# Patient Record
Sex: FEMALE | Race: WHITE | HISPANIC OR LATINO
[De-identification: names, ages, dates, MRNs, and addresses within clinical notes are randomized per-mention and may not be internally consistent; named-entity substitution may affect disease eponyms.]

---

## 2017-02-23 ENCOUNTER — APPOINTMENT (OUTPATIENT)
Dept: OBGYN | Facility: CLINIC | Age: 42
End: 2017-02-23

## 2017-03-07 ENCOUNTER — APPOINTMENT (OUTPATIENT)
Dept: OBGYN | Facility: CLINIC | Age: 42
End: 2017-03-07

## 2017-03-07 VITALS
DIASTOLIC BLOOD PRESSURE: 70 MMHG | HEIGHT: 59 IN | BODY MASS INDEX: 29.43 KG/M2 | WEIGHT: 146 LBS | SYSTOLIC BLOOD PRESSURE: 110 MMHG

## 2017-03-07 DIAGNOSIS — N39.3 STRESS INCONTINENCE (FEMALE) (MALE): ICD-10-CM

## 2017-03-09 LAB — HPV HIGH+LOW RISK DNA PNL CVX: NEGATIVE

## 2017-03-10 LAB — CYTOLOGY CVX/VAG DOC THIN PREP: NORMAL

## 2018-04-17 ENCOUNTER — APPOINTMENT (OUTPATIENT)
Dept: OBGYN | Facility: CLINIC | Age: 43
End: 2018-04-17
Payer: COMMERCIAL

## 2018-04-17 VITALS
SYSTOLIC BLOOD PRESSURE: 123 MMHG | HEIGHT: 59 IN | DIASTOLIC BLOOD PRESSURE: 81 MMHG | BODY MASS INDEX: 29.48 KG/M2 | WEIGHT: 146.25 LBS

## 2018-04-17 DIAGNOSIS — Z86.19 PERSONAL HISTORY OF OTHER INFECTIOUS AND PARASITIC DISEASES: ICD-10-CM

## 2018-04-17 DIAGNOSIS — L29.2 PRURITUS VULVAE: ICD-10-CM

## 2018-04-17 PROCEDURE — 99396 PREV VISIT EST AGE 40-64: CPT

## 2018-04-17 RX ORDER — NYSTATIN AND TRIAMCINOLONE ACETONIDE 100000; 1 [USP'U]/G; MG/G
100000-0.1 OINTMENT TOPICAL TWICE DAILY
Qty: 1 | Refills: 1 | Status: DISCONTINUED | COMMUNITY
Start: 2017-03-07 | End: 2018-04-17

## 2018-04-18 LAB — HPV HIGH+LOW RISK DNA PNL CVX: NOT DETECTED

## 2018-04-23 LAB — CYTOLOGY CVX/VAG DOC THIN PREP: NORMAL

## 2018-06-21 ENCOUNTER — APPOINTMENT (OUTPATIENT)
Dept: BREAST CENTER | Facility: CLINIC | Age: 43
End: 2018-06-21

## 2019-05-22 ENCOUNTER — RX RENEWAL (OUTPATIENT)
Age: 44
End: 2019-05-22

## 2019-10-08 ENCOUNTER — APPOINTMENT (OUTPATIENT)
Dept: OBGYN | Facility: CLINIC | Age: 44
End: 2019-10-08
Payer: COMMERCIAL

## 2019-10-08 VITALS
BODY MASS INDEX: 28.65 KG/M2 | DIASTOLIC BLOOD PRESSURE: 80 MMHG | WEIGHT: 142.13 LBS | SYSTOLIC BLOOD PRESSURE: 110 MMHG | HEIGHT: 59 IN

## 2019-10-08 PROCEDURE — 99396 PREV VISIT EST AGE 40-64: CPT

## 2019-10-08 NOTE — PROCEDURE
[Cervical Pap Smear] : cervical Pap smear [Tolerated Well] : the patient tolerated the procedure well [No Complications] : there were no complications

## 2019-10-08 NOTE — PHYSICAL EXAM
[Alert] : alert [Awake] : awake [Soft] : soft [Oriented x3] : oriented to person, place, and time [Uterine Adnexae] : were not tender and not enlarged [No Bleeding] : there was no active vaginal bleeding [Normal] : uterus [Acute Distress] : no acute distress [LAD] : no lymphadenopathy [Thyroid Nodule] : no thyroid nodule [Goiter] : no goiter [Mass] : no breast mass [Axillary LAD] : no axillary lymphadenopathy [Nipple Discharge] : no nipple discharge [H/Smegaly] : no hepatosplenomegaly [Distended] : not distended [Tender] : non tender [Flat Affect] : affect not flat [Depressed Mood] : not depressed

## 2019-10-09 LAB — HPV HIGH+LOW RISK DNA PNL CVX: NOT DETECTED

## 2019-10-14 LAB — CYTOLOGY CVX/VAG DOC THIN PREP: NORMAL

## 2020-09-30 ENCOUNTER — EMERGENCY (EMERGENCY)
Facility: HOSPITAL | Age: 45
LOS: 1 days | Discharge: HOME | End: 2020-09-30
Attending: EMERGENCY MEDICINE
Payer: COMMERCIAL

## 2020-09-30 VITALS
HEART RATE: 102 BPM | DIASTOLIC BLOOD PRESSURE: 79 MMHG | SYSTOLIC BLOOD PRESSURE: 132 MMHG | HEIGHT: 59 IN | TEMPERATURE: 98 F | WEIGHT: 139.99 LBS | RESPIRATION RATE: 20 BRPM | OXYGEN SATURATION: 98 %

## 2020-09-30 VITALS
RESPIRATION RATE: 17 BRPM | SYSTOLIC BLOOD PRESSURE: 116 MMHG | HEART RATE: 71 BPM | DIASTOLIC BLOOD PRESSURE: 70 MMHG | OXYGEN SATURATION: 98 %

## 2020-09-30 LAB
ALBUMIN SERPL ELPH-MCNC: 4.4 G/DL — SIGNIFICANT CHANGE UP (ref 3.3–5.2)
ALP SERPL-CCNC: 81 U/L — SIGNIFICANT CHANGE UP (ref 40–120)
ALT FLD-CCNC: 14 U/L — SIGNIFICANT CHANGE UP
ANION GAP SERPL CALC-SCNC: 14 MMOL/L — SIGNIFICANT CHANGE UP (ref 5–17)
AST SERPL-CCNC: 19 U/L — SIGNIFICANT CHANGE UP
BASOPHILS # BLD AUTO: 0.02 K/UL — SIGNIFICANT CHANGE UP (ref 0–0.2)
BASOPHILS NFR BLD AUTO: 0.3 % — SIGNIFICANT CHANGE UP (ref 0–2)
BILIRUB SERPL-MCNC: 0.3 MG/DL — LOW (ref 0.4–2)
BUN SERPL-MCNC: 17 MG/DL — SIGNIFICANT CHANGE UP (ref 8–20)
CALCIUM SERPL-MCNC: 9.2 MG/DL — SIGNIFICANT CHANGE UP (ref 8.6–10.2)
CHLORIDE SERPL-SCNC: 103 MMOL/L — SIGNIFICANT CHANGE UP (ref 98–107)
CO2 SERPL-SCNC: 22 MMOL/L — SIGNIFICANT CHANGE UP (ref 22–29)
CREAT SERPL-MCNC: 0.69 MG/DL — SIGNIFICANT CHANGE UP (ref 0.5–1.3)
EOSINOPHIL # BLD AUTO: 0.04 K/UL — SIGNIFICANT CHANGE UP (ref 0–0.5)
EOSINOPHIL NFR BLD AUTO: 0.6 % — SIGNIFICANT CHANGE UP (ref 0–6)
GLUCOSE SERPL-MCNC: 111 MG/DL — HIGH (ref 70–99)
HCG SERPL-ACNC: <4 MIU/ML — SIGNIFICANT CHANGE UP
HCT VFR BLD CALC: 35.7 % — SIGNIFICANT CHANGE UP (ref 34.5–45)
HGB BLD-MCNC: 12 G/DL — SIGNIFICANT CHANGE UP (ref 11.5–15.5)
IMM GRANULOCYTES NFR BLD AUTO: 0.3 % — SIGNIFICANT CHANGE UP (ref 0–1.5)
LYMPHOCYTES # BLD AUTO: 0.95 K/UL — LOW (ref 1–3.3)
LYMPHOCYTES # BLD AUTO: 14.7 % — SIGNIFICANT CHANGE UP (ref 13–44)
MCHC RBC-ENTMCNC: 28.4 PG — SIGNIFICANT CHANGE UP (ref 27–34)
MCHC RBC-ENTMCNC: 33.6 GM/DL — SIGNIFICANT CHANGE UP (ref 32–36)
MCV RBC AUTO: 84.6 FL — SIGNIFICANT CHANGE UP (ref 80–100)
MONOCYTES # BLD AUTO: 0.39 K/UL — SIGNIFICANT CHANGE UP (ref 0–0.9)
MONOCYTES NFR BLD AUTO: 6 % — SIGNIFICANT CHANGE UP (ref 2–14)
NEUTROPHILS # BLD AUTO: 5.03 K/UL — SIGNIFICANT CHANGE UP (ref 1.8–7.4)
NEUTROPHILS NFR BLD AUTO: 78.1 % — HIGH (ref 43–77)
PLATELET # BLD AUTO: 179 K/UL — SIGNIFICANT CHANGE UP (ref 150–400)
POTASSIUM SERPL-MCNC: 3.4 MMOL/L — LOW (ref 3.5–5.3)
POTASSIUM SERPL-SCNC: 3.4 MMOL/L — LOW (ref 3.5–5.3)
PROT SERPL-MCNC: 7.3 G/DL — SIGNIFICANT CHANGE UP (ref 6.6–8.7)
RBC # BLD: 4.22 M/UL — SIGNIFICANT CHANGE UP (ref 3.8–5.2)
RBC # FLD: 13.2 % — SIGNIFICANT CHANGE UP (ref 10.3–14.5)
SODIUM SERPL-SCNC: 139 MMOL/L — SIGNIFICANT CHANGE UP (ref 135–145)
TSH SERPL-MCNC: 0.64 UIU/ML — SIGNIFICANT CHANGE UP (ref 0.27–4.2)
WBC # BLD: 6.45 K/UL — SIGNIFICANT CHANGE UP (ref 3.8–10.5)
WBC # FLD AUTO: 6.45 K/UL — SIGNIFICANT CHANGE UP (ref 3.8–10.5)

## 2020-09-30 PROCEDURE — 80053 COMPREHEN METABOLIC PANEL: CPT

## 2020-09-30 PROCEDURE — 93010 ELECTROCARDIOGRAM REPORT: CPT

## 2020-09-30 PROCEDURE — 99283 EMERGENCY DEPT VISIT LOW MDM: CPT | Mod: 25

## 2020-09-30 PROCEDURE — 93005 ELECTROCARDIOGRAM TRACING: CPT

## 2020-09-30 PROCEDURE — 84702 CHORIONIC GONADOTROPIN TEST: CPT

## 2020-09-30 PROCEDURE — 85025 COMPLETE CBC W/AUTO DIFF WBC: CPT

## 2020-09-30 PROCEDURE — 99284 EMERGENCY DEPT VISIT MOD MDM: CPT

## 2020-09-30 PROCEDURE — 84443 ASSAY THYROID STIM HORMONE: CPT

## 2020-09-30 PROCEDURE — 36415 COLL VENOUS BLD VENIPUNCTURE: CPT

## 2020-09-30 PROCEDURE — 96360 HYDRATION IV INFUSION INIT: CPT

## 2020-09-30 RX ORDER — SODIUM CHLORIDE 9 MG/ML
1000 INJECTION INTRAMUSCULAR; INTRAVENOUS; SUBCUTANEOUS ONCE
Refills: 0 | Status: COMPLETED | OUTPATIENT
Start: 2020-09-30 | End: 2020-09-30

## 2020-09-30 RX ORDER — ALPRAZOLAM 0.25 MG
0.25 TABLET ORAL ONCE
Refills: 0 | Status: DISCONTINUED | OUTPATIENT
Start: 2020-09-30 | End: 2020-09-30

## 2020-09-30 RX ORDER — POTASSIUM CHLORIDE 20 MEQ
40 PACKET (EA) ORAL ONCE
Refills: 0 | Status: COMPLETED | OUTPATIENT
Start: 2020-09-30 | End: 2020-09-30

## 2020-09-30 RX ADMIN — Medication 40 MILLIEQUIVALENT(S): at 14:05

## 2020-09-30 RX ADMIN — SODIUM CHLORIDE 1000 MILLILITER(S): 9 INJECTION INTRAMUSCULAR; INTRAVENOUS; SUBCUTANEOUS at 12:02

## 2020-09-30 RX ADMIN — Medication 0.25 MILLIGRAM(S): at 12:02

## 2020-09-30 RX ADMIN — SODIUM CHLORIDE 1000 MILLILITER(S): 9 INJECTION INTRAMUSCULAR; INTRAVENOUS; SUBCUTANEOUS at 13:00

## 2020-09-30 NOTE — ED ADULT NURSE NOTE - CHIEF COMPLAINT QUOTE
pt A&Ox 4 from work c/o panic attack, pins & needles in hands and feet and palpitations. Pt with history of panic attacks, took CBD oil during attack with no relief. Denies chest pain or SOB.

## 2020-09-30 NOTE — ED PROVIDER NOTE - ATTENDING CONTRIBUTION TO CARE
well appearing adult feeling with symptoms consistent with anxiety and prior panic attack; on exam, well appearing, anxious, but NAD; clear conjunctiva; clear oropharync; normal heart and lung sounds, well perfused, abd soft nt nd, fully ambulatory; agree with acp plan of care

## 2020-09-30 NOTE — ED PROVIDER NOTE - PATIENT PORTAL LINK FT
You can access the FollowMyHealth Patient Portal offered by [facility] by registering at the following website: [email or web address]. By joining [facility]’s FollowMyHealth portal, you will also be able to view your health information using other applications (apps) compatible with our system.

## 2020-09-30 NOTE — ED PROVIDER NOTE - MUSCULOSKELETAL NEGATIVE STATEMENT, MLM
Spontaneous, unlabored and symmetrical no back pain, no gout, no musculoskeletal pain, no neck pain, and no weakness.

## 2020-09-30 NOTE — ED PROVIDER NOTE - OBJECTIVE STATEMENT
Pt is a 45y F with PMH of HLD and anxiety presenting for heart racing/ numbness to b/l feet/hands/ mouth. She states she was at work and started having these symptoms like she was going to pass out. She reported felling very heavy and having a dry mouth. She states she used to take Lexapro but stopped taking it for a while. She started taking it again around 3 weeks ago. She also takes xanax 0.5mg which she has been taking for the last 3 days. She admits to trying CBD oil for her anxiety as well. Pt states she usually gets these symptoms with her anxiety but they are worse today. She is feeling a little better now than when she came in initially. She denies any SI/HI. She denies any chest pain/ abd pain/ fever/ nausea/ vomiting/diarrhea/ drugs/ETOH use/ smoking.

## 2020-09-30 NOTE — ED PROVIDER NOTE - CLINICAL SUMMARY MEDICAL DECISION MAKING FREE TEXT BOX
Pt is a 45y M with PMH of anxiety/ HLD presenting for panic attack. Will obtain labs/medicate and reassess.

## 2020-09-30 NOTE — ED PROVIDER NOTE - PROGRESS NOTE DETAILS
Pt feeling better after medication. Labs reviewed with pt and told of need to f/u with PMD and psych. Pt agreeable. She has a prescription for xanax at home. Marvin novak.

## 2020-09-30 NOTE — ED PROVIDER NOTE - NSFOLLOWUPINSTRUCTIONS_ED_ALL_ED_FT
Follow up with PMD.  Take medication as prescribed by your doctor.  Come back with new or worsening symptoms.    Anxiety    Generalized anxiety disorder (ENRIQUE) is a mental disorder. It is defined as anxiety that is not necessarily related to specific events or activities or is out of proportion to said events. Symptoms include restlessness, fatigue, difficulty concentrations, irritability and difficulty concentrating. It may interfere with life functions, including relationships, work, and school. If you were started on a medication, make sure to take exactly as prescribed and follow up with a psychiatrist.    SEEK IMMEDIATE MEDICAL CARE IF YOU HAVE ANY OF THE FOLLOWING SYMPTOMS: thoughts about hurting killing yourself, thoughts about hurting or killing somebody else, hallucinations, or worsening depression.

## 2020-09-30 NOTE — ED ADULT TRIAGE NOTE - CHIEF COMPLAINT QUOTE
HPI Comments: 52 y.o. female with past medical history significant for anemia, bronchitis, who presents ambulatory to the ED accompanied by spouse, with chief complaint of persistent cough x 1 week. Pt estimates that she has had a non-productive cough on a daily basis for the past seven days. She additionally c/o congestion, rhinorrhea, and sinus pressure associated with the cough. Pt states that she normally develops bronchitis on a yearly basis, and her current symptoms feel similar to when she has had bronchitis in the past. She notes that she used to have an inhaler at home, but ran out of that prescription. Pt reports positive sick contacts, noting that she runs a  out of her home. Her  is sick at home with similar symptoms, and is also here for evaluation and treatment in the ED today. Pt denies any diagnosed h/o asthma, and she reports the last time she was treated for bronchitis was ~1 year ago. She specifically denies any fevers, chills, CP, and abdominal pain. There are no other acute medical concerns at this time. Social hx: Negative for Tobacco use; Negative for EtOH use; Negative for Illicit Drug Abuse    PCP: None     Note written by Emanuel Rico. Memory Natan, as dictated by LUTHER Mcdowell 7:36 PM     The history is provided by the patient. No  was used. Past Medical History:   Diagnosis Date    Anemia     Bronchitis     History of endometrial biopsy 07/14/2017       Past Surgical History:   Procedure Laterality Date    ABDOMEN SURGERY PROC UNLISTED  2008    removed gallstones, not gallbladder    HX APPENDECTOMY      HX CHOLECYSTECTOMY  2008    w/appendix out also    HX OTHER SURGICAL Right 01/2015    wrist surgery     HX TOTAL LAPAROSCOPIC HYSTERECTOMY W/ BS&O  07/20/2017    HX TUBAL LIGATION           History reviewed. No pertinent family history.     Social History     Social History    Marital status:      Spouse name: N/A   Edmar Number of children: N/A    Years of education: N/A     Occupational History    Not on file. Social History Main Topics    Smoking status: Never Smoker    Smokeless tobacco: Never Used    Alcohol use No    Drug use: No    Sexual activity: Yes     Partners: Male     Birth control/ protection: Surgical     Other Topics Concern    Not on file     Social History Narrative         ALLERGIES: Review of patient's allergies indicates no known allergies. Review of Systems   Constitutional: Negative. Negative for chills and fever. HENT: Positive for congestion, rhinorrhea and sinus pressure. Negative for ear discharge. Eyes: Negative for photophobia, pain, discharge and visual disturbance. Respiratory: Positive for cough. Negative for apnea and wheezing. Cardiovascular: Negative for chest pain, palpitations and leg swelling. Gastrointestinal: Negative for abdominal distention, abdominal pain and blood in stool. Genitourinary: Negative for difficulty urinating, dysuria, flank pain, frequency and hematuria. Musculoskeletal: Negative for back pain, gait problem, joint swelling, myalgias and neck pain. Skin: Negative for color change and pallor. Neurological: Negative for dizziness, syncope, weakness, numbness and headaches. Psychiatric/Behavioral: Negative for behavioral problems and confusion. The patient is not nervous/anxious. Vitals:    01/08/18 1851   BP: 110/62   Pulse: 79   Resp: 20   Temp: 98.2 °F (36.8 °C)   SpO2: 98%   Weight: 59 kg (130 lb)   Height: 4' 11\" (1.499 m)            Physical Exam   Constitutional: She is oriented to person, place, and time. She appears well-developed and well-nourished. No distress. HENT:   Head: Normocephalic and atraumatic. Right Ear: External ear normal.   Left Ear: External ear normal.   Nose: Rhinorrhea (clear) present.    Mouth/Throat: Oropharynx is clear and moist.   Eyes: Conjunctivae and EOM are normal. Pupils are equal, round, and reactive to light. Right eye exhibits no discharge. Left eye exhibits no discharge. Neck: Normal range of motion. Neck supple. Cardiovascular: Normal rate, regular rhythm, normal heart sounds and intact distal pulses. Pulmonary/Chest: Effort normal. She has decreased breath sounds (mildly diminished in bilateral bases). Abdominal: Soft. Bowel sounds are normal. She exhibits no distension. There is no tenderness. There is no rebound and no guarding. Musculoskeletal: Normal range of motion. She exhibits no edema or tenderness. Neurological: She is alert and oriented to person, place, and time. No cranial nerve deficit. Coordination normal.   Skin: Skin is warm and dry. No rash noted. Psychiatric: She has a normal mood and affect. Her behavior is normal. Judgment and thought content normal.   Nursing note and vitals reviewed. Note written by Darlene Wright, as dictated by LUTHER Monroy 7:36 PM     MDM  Number of Diagnoses or Management Options  Cough:   Diagnosis management comments: 51 yo female with cough and sinus pressure x 1 week;  with same; hx of bronchitis and feels similar; will treat and have PCP followup. LUTHER Monroy         Amount and/or Complexity of Data Reviewed  Tests in the radiology section of CPT®: ordered and reviewed  Discuss the patient with other providers: yes  Independent visualization of images, tracings, or specimens: yes      ED Course       Procedures    PROGRESS NOTE:  8:26 PM   Discussed case with Nikole Goyal MD, who agrees with plan. Will discharge patient with PCP follow-up. She is agreeable with plan. Patient's results have been reviewed with them. Patient and/or family have verbally conveyed their understanding and agreement of the patient's signs, symptoms, diagnosis, treatment and prognosis and additionally agree to follow up as recommended or return to the Emergency Room should their condition change prior to follow-up. Discharge instructions have also been provided to the patient with some educational information regarding their diagnosis as well a list of reasons why they would want to return to the ER prior to their follow-up appointment should their condition change.   LUTHER Alvares pt A&Ox 4 from work c/o panic attack, pins & needles in hands and feet and palpitations. Pt with history of panic attacks, took CBD oil during attack with no relief. Denies chest pain or SOB.

## 2020-10-29 ENCOUNTER — APPOINTMENT (OUTPATIENT)
Dept: OBGYN | Facility: CLINIC | Age: 45
End: 2020-10-29
Payer: COMMERCIAL

## 2020-10-29 VITALS
DIASTOLIC BLOOD PRESSURE: 80 MMHG | HEIGHT: 59 IN | WEIGHT: 136.13 LBS | SYSTOLIC BLOOD PRESSURE: 120 MMHG | BODY MASS INDEX: 27.44 KG/M2 | TEMPERATURE: 97.1 F

## 2020-10-29 DIAGNOSIS — F41.0 PANIC DISORDER [EPISODIC PAROXYSMAL ANXIETY]: ICD-10-CM

## 2020-10-29 DIAGNOSIS — R92.8 OTHER ABNORMAL AND INCONCLUSIVE FINDINGS ON DIAGNOSTIC IMAGING OF BREAST: ICD-10-CM

## 2020-10-29 DIAGNOSIS — R10.2 PELVIC AND PERINEAL PAIN: ICD-10-CM

## 2020-10-29 DIAGNOSIS — N60.01 SOLITARY CYST OF RIGHT BREAST: ICD-10-CM

## 2020-10-29 PROCEDURE — 99396 PREV VISIT EST AGE 40-64: CPT

## 2020-10-29 PROCEDURE — 99072 ADDL SUPL MATRL&STAF TM PHE: CPT

## 2020-10-29 RX ORDER — ALPRAZOLAM 0.5 MG/1
0.5 TABLET ORAL
Refills: 0 | Status: ACTIVE | COMMUNITY
Start: 2020-10-29

## 2020-10-29 NOTE — HISTORY OF PRESENT ILLNESS
[Mammogramdate] : 9/19 W F/U RIGHT SIDE IN 3/20 [PapSmeardate] : 2019 [Regular Cycle Intervals] : periods have been regular

## 2020-10-30 LAB — HPV HIGH+LOW RISK DNA PNL CVX: NOT DETECTED

## 2020-11-03 LAB — CYTOLOGY CVX/VAG DOC THIN PREP: NORMAL

## 2021-09-28 PROBLEM — F41.9 ANXIETY DISORDER, UNSPECIFIED: Chronic | Status: ACTIVE | Noted: 2020-09-30

## 2021-09-28 PROBLEM — E78.00 PURE HYPERCHOLESTEROLEMIA, UNSPECIFIED: Chronic | Status: ACTIVE | Noted: 2020-09-30

## 2021-10-29 ENCOUNTER — NON-APPOINTMENT (OUTPATIENT)
Age: 46
End: 2021-10-29

## 2021-10-29 ENCOUNTER — APPOINTMENT (OUTPATIENT)
Dept: OBGYN | Facility: CLINIC | Age: 46
End: 2021-10-29
Payer: COMMERCIAL

## 2021-10-29 VITALS
BODY MASS INDEX: 31.25 KG/M2 | SYSTOLIC BLOOD PRESSURE: 110 MMHG | WEIGHT: 155 LBS | HEIGHT: 59 IN | DIASTOLIC BLOOD PRESSURE: 70 MMHG

## 2021-10-29 DIAGNOSIS — Z83.3 FAMILY HISTORY OF DIABETES MELLITUS: ICD-10-CM

## 2021-10-29 DIAGNOSIS — Z01.419 ENCOUNTER FOR GYNECOLOGICAL EXAMINATION (GENERAL) (ROUTINE) W/OUT ABNORMAL FINDINGS: ICD-10-CM

## 2021-10-29 DIAGNOSIS — Z82.49 FAMILY HISTORY OF ISCHEMIC HEART DISEASE AND OTHER DISEASES OF THE CIRCULATORY SYSTEM: ICD-10-CM

## 2021-10-29 DIAGNOSIS — Z83.42 FAMILY HISTORY OF FAMILIAL HYPERCHOLESTEROLEMIA: ICD-10-CM

## 2021-10-29 PROCEDURE — 99386 PREV VISIT NEW AGE 40-64: CPT

## 2021-10-29 PROCEDURE — 36415 COLL VENOUS BLD VENIPUNCTURE: CPT

## 2021-10-29 RX ORDER — ELVITEGRAVIR, COBICISTAT, EMTRICITABINE, AND TENOFOVIR ALAFENAMIDE 150; 150; 200; 10 MG/1; MG/1; MG/1; MG/1
TABLET ORAL
Refills: 0 | Status: ACTIVE | COMMUNITY

## 2021-10-29 RX ORDER — ESCITALOPRAM OXALATE 5 MG/1
TABLET, FILM COATED ORAL
Refills: 0 | Status: ACTIVE | COMMUNITY

## 2021-10-29 RX ORDER — NYSTATIN 100000 [USP'U]/G
100000 CREAM TOPICAL TWICE DAILY
Qty: 1 | Refills: 1 | Status: DISCONTINUED | COMMUNITY
Start: 2021-04-22 | End: 2021-10-29

## 2021-10-29 NOTE — PHYSICAL EXAM
[Chaperone Present] : A chaperone was present in the examining room during all aspects of the physical examination [Appropriately responsive] : appropriately responsive [Alert] : alert [No Acute Distress] : no acute distress [Soft] : soft [Non-tender] : non-tender [Non-distended] : non-distended [No Mass] : no mass [Oriented x3] : oriented x3 [Examination Of The Breasts] : a normal appearance [No Discharge] : no discharge [No Masses] : no breast masses were palpable [Labia Majora] : normal [Labia Minora] : normal [No Bleeding] : There was no active vaginal bleeding [Normal] : normal [Uterine Adnexae] : normal

## 2021-11-03 LAB
C TRACH RRNA SPEC QL NAA+PROBE: NOT DETECTED
CANDIDA VAG CYTO: NOT DETECTED
ESTRADIOL SERPL-MCNC: 88 PG/ML
FSH SERPL-MCNC: 6 IU/L
G VAGINALIS+PREV SP MTYP VAG QL MICRO: NOT DETECTED
HAV IGM SER QL: NONREACTIVE
HBV CORE IGM SER QL: NONREACTIVE
HBV SURFACE AG SER QL: NONREACTIVE
HCG SERPL-MCNC: <1 MIU/ML
HCV AB SER QL: NONREACTIVE
HCV S/CO RATIO: 0.13 S/CO
HPV HIGH+LOW RISK DNA PNL CVX: NOT DETECTED
HSV 1+2 IGG SER IA-IMP: POSITIVE
HSV 1+2 IGG SER IA-IMP: POSITIVE
HSV1 IGG SER QL: 23.3 INDEX
HSV2 IGG SER QL: 18.9 INDEX
N GONORRHOEA RRNA SPEC QL NAA+PROBE: NOT DETECTED
SOURCE AMPLIFICATION: NORMAL
SOURCE TP AMPLIFICATION: NORMAL
T PALLIDUM AB SER QL IA: NEGATIVE
T VAGINALIS RRNA SPEC QL NAA+PROBE: NOT DETECTED
T VAGINALIS VAG QL WET PREP: NOT DETECTED
TSH SERPL-ACNC: 1.01 UIU/ML

## 2021-11-04 LAB — CYTOLOGY CVX/VAG DOC THIN PREP: NORMAL

## 2021-11-09 ENCOUNTER — NON-APPOINTMENT (OUTPATIENT)
Age: 46
End: 2021-11-09

## 2021-11-09 LAB
BASOPHILS # BLD AUTO: 0.07 K/UL
BASOPHILS NFR BLD AUTO: 0.9 %
EOSINOPHIL # BLD AUTO: 0.83 K/UL
EOSINOPHIL NFR BLD AUTO: 10.5 %
HCT VFR BLD CALC: 36.5 %
HGB BLD-MCNC: 11.3 G/DL
IMM GRANULOCYTES NFR BLD AUTO: 0.4 %
LYMPHOCYTES # BLD AUTO: 2.26 K/UL
LYMPHOCYTES NFR BLD AUTO: 28.5 %
MAN DIFF?: NORMAL
MCHC RBC-ENTMCNC: 27.2 PG
MCHC RBC-ENTMCNC: 31 GM/DL
MCV RBC AUTO: 88 FL
MONOCYTES # BLD AUTO: 0.74 K/UL
MONOCYTES NFR BLD AUTO: 9.3 %
NEUTROPHILS # BLD AUTO: 3.99 K/UL
NEUTROPHILS NFR BLD AUTO: 50.4 %
PLATELET # BLD AUTO: 216 K/UL
PROLACTIN SERPL-MCNC: 27.4 NG/ML
RBC # BLD: 4.15 M/UL
RBC # FLD: 14.2 %
WBC # FLD AUTO: 7.92 K/UL

## 2021-11-15 ENCOUNTER — APPOINTMENT (OUTPATIENT)
Dept: OBGYN | Facility: CLINIC | Age: 46
End: 2021-11-15
Payer: COMMERCIAL

## 2021-11-15 ENCOUNTER — ASOB RESULT (OUTPATIENT)
Age: 46
End: 2021-11-15

## 2021-11-15 VITALS
WEIGHT: 150 LBS | BODY MASS INDEX: 30.24 KG/M2 | HEIGHT: 59 IN | SYSTOLIC BLOOD PRESSURE: 106 MMHG | DIASTOLIC BLOOD PRESSURE: 68 MMHG

## 2021-11-15 DIAGNOSIS — Z86.018 PERSONAL HISTORY OF OTHER BENIGN NEOPLASM: ICD-10-CM

## 2021-11-15 PROCEDURE — 76830 TRANSVAGINAL US NON-OB: CPT

## 2021-11-15 PROCEDURE — 99213 OFFICE O/P EST LOW 20 MIN: CPT | Mod: 25

## 2021-11-17 ENCOUNTER — NON-APPOINTMENT (OUTPATIENT)
Age: 46
End: 2021-11-17

## 2021-11-17 LAB
HSV+VZV DNA SPEC QL NAA+PROBE: NOT DETECTED
SPECIMEN SOURCE: NORMAL

## 2021-11-19 ENCOUNTER — NON-APPOINTMENT (OUTPATIENT)
Age: 46
End: 2021-11-19

## 2021-11-19 LAB
CANDIDA VAG CYTO: DETECTED
G VAGINALIS+PREV SP MTYP VAG QL MICRO: DETECTED
T VAGINALIS VAG QL WET PREP: NOT DETECTED

## 2022-01-07 ENCOUNTER — OUTPATIENT (OUTPATIENT)
Dept: OUTPATIENT SERVICES | Facility: HOSPITAL | Age: 47
LOS: 1 days | End: 2022-01-07
Payer: COMMERCIAL

## 2022-01-07 DIAGNOSIS — Z01.818 ENCOUNTER FOR OTHER PREPROCEDURAL EXAMINATION: ICD-10-CM

## 2022-01-07 LAB
A1C WITH ESTIMATED AVERAGE GLUCOSE RESULT: 5.6 % — SIGNIFICANT CHANGE UP (ref 4–5.6)
ALBUMIN SERPL ELPH-MCNC: 4.3 G/DL — SIGNIFICANT CHANGE UP (ref 3.3–5.2)
ALP SERPL-CCNC: 85 U/L — SIGNIFICANT CHANGE UP (ref 40–120)
ALT FLD-CCNC: 13 U/L — SIGNIFICANT CHANGE UP
ANION GAP SERPL CALC-SCNC: 15 MMOL/L — SIGNIFICANT CHANGE UP (ref 5–17)
APTT BLD: 27 SEC — LOW (ref 27.5–35.5)
AST SERPL-CCNC: 15 U/L — SIGNIFICANT CHANGE UP
BASOPHILS # BLD AUTO: 0.01 K/UL — SIGNIFICANT CHANGE UP (ref 0–0.2)
BASOPHILS NFR BLD AUTO: 0.1 % — SIGNIFICANT CHANGE UP (ref 0–2)
BILIRUB SERPL-MCNC: 0.4 MG/DL — SIGNIFICANT CHANGE UP (ref 0.4–2)
BUN SERPL-MCNC: 16.3 MG/DL — SIGNIFICANT CHANGE UP (ref 8–20)
CALCIUM SERPL-MCNC: 8.8 MG/DL — SIGNIFICANT CHANGE UP (ref 8.6–10.2)
CHLORIDE SERPL-SCNC: 98 MMOL/L — SIGNIFICANT CHANGE UP (ref 98–107)
CO2 SERPL-SCNC: 22 MMOL/L — SIGNIFICANT CHANGE UP (ref 22–29)
CREAT SERPL-MCNC: 0.64 MG/DL — SIGNIFICANT CHANGE UP (ref 0.5–1.3)
EOSINOPHIL # BLD AUTO: 0 K/UL — SIGNIFICANT CHANGE UP (ref 0–0.5)
EOSINOPHIL NFR BLD AUTO: 0 % — SIGNIFICANT CHANGE UP (ref 0–6)
ESTIMATED AVERAGE GLUCOSE: 114 MG/DL — SIGNIFICANT CHANGE UP (ref 68–114)
GLUCOSE SERPL-MCNC: 119 MG/DL — HIGH (ref 70–99)
HCG SERPL-ACNC: <4 MIU/ML — SIGNIFICANT CHANGE UP
HCT VFR BLD CALC: 36.3 % — SIGNIFICANT CHANGE UP (ref 34.5–45)
HGB BLD-MCNC: 11.4 G/DL — LOW (ref 11.5–15.5)
IMM GRANULOCYTES NFR BLD AUTO: 0.5 % — SIGNIFICANT CHANGE UP (ref 0–1.5)
INR BLD: 1.08 RATIO — SIGNIFICANT CHANGE UP (ref 0.88–1.16)
LYMPHOCYTES # BLD AUTO: 1.88 K/UL — SIGNIFICANT CHANGE UP (ref 1–3.3)
LYMPHOCYTES # BLD AUTO: 18.8 % — SIGNIFICANT CHANGE UP (ref 13–44)
MCHC RBC-ENTMCNC: 26.4 PG — LOW (ref 27–34)
MCHC RBC-ENTMCNC: 31.4 GM/DL — LOW (ref 32–36)
MCV RBC AUTO: 84 FL — SIGNIFICANT CHANGE UP (ref 80–100)
MONOCYTES # BLD AUTO: 0.45 K/UL — SIGNIFICANT CHANGE UP (ref 0–0.9)
MONOCYTES NFR BLD AUTO: 4.5 % — SIGNIFICANT CHANGE UP (ref 2–14)
NEUTROPHILS # BLD AUTO: 7.62 K/UL — HIGH (ref 1.8–7.4)
NEUTROPHILS NFR BLD AUTO: 76.1 % — SIGNIFICANT CHANGE UP (ref 43–77)
PLATELET # BLD AUTO: 268 K/UL — SIGNIFICANT CHANGE UP (ref 150–400)
POTASSIUM SERPL-MCNC: 3.7 MMOL/L — SIGNIFICANT CHANGE UP (ref 3.5–5.3)
POTASSIUM SERPL-SCNC: 3.7 MMOL/L — SIGNIFICANT CHANGE UP (ref 3.5–5.3)
PROT SERPL-MCNC: 7.6 G/DL — SIGNIFICANT CHANGE UP (ref 6.6–8.7)
PROTHROM AB SERPL-ACNC: 12.5 SEC — SIGNIFICANT CHANGE UP (ref 10.6–13.6)
RBC # BLD: 4.32 M/UL — SIGNIFICANT CHANGE UP (ref 3.8–5.2)
RBC # FLD: 14.6 % — HIGH (ref 10.3–14.5)
SODIUM SERPL-SCNC: 135 MMOL/L — SIGNIFICANT CHANGE UP (ref 135–145)
WBC # BLD: 10.01 K/UL — SIGNIFICANT CHANGE UP (ref 3.8–10.5)
WBC # FLD AUTO: 10.01 K/UL — SIGNIFICANT CHANGE UP (ref 3.8–10.5)

## 2022-01-07 PROCEDURE — 71046 X-RAY EXAM CHEST 2 VIEWS: CPT

## 2022-01-07 PROCEDURE — 85025 COMPLETE CBC W/AUTO DIFF WBC: CPT

## 2022-01-07 PROCEDURE — 93010 ELECTROCARDIOGRAM REPORT: CPT

## 2022-01-07 PROCEDURE — 93005 ELECTROCARDIOGRAM TRACING: CPT

## 2022-01-07 PROCEDURE — G0463: CPT

## 2022-01-07 PROCEDURE — 85610 PROTHROMBIN TIME: CPT

## 2022-01-07 PROCEDURE — 83036 HEMOGLOBIN GLYCOSYLATED A1C: CPT

## 2022-01-07 PROCEDURE — 85730 THROMBOPLASTIN TIME PARTIAL: CPT

## 2022-01-07 PROCEDURE — 84702 CHORIONIC GONADOTROPIN TEST: CPT

## 2022-01-07 PROCEDURE — 71046 X-RAY EXAM CHEST 2 VIEWS: CPT | Mod: 26

## 2022-01-07 PROCEDURE — 80053 COMPREHEN METABOLIC PANEL: CPT

## 2022-01-07 PROCEDURE — 36415 COLL VENOUS BLD VENIPUNCTURE: CPT

## 2022-01-10 NOTE — DISCUSSION/SUMMARY
[FreeTextEntry1] : -Annual well woman visit done today. Pap collected. Patient desires STI testing- labs were ordered.\par \par -Heavy menses and h/o uterine leiomyoma- AUB labs and pelvic sono ordered.\par \par -Rx for screening mammogram was ordered. Benign breast exam.\par \par -She was advised to take vitamin D, calcium, and continue weightbearing exercises.\par \par -Follow up in 2 weeks with pelvic sono.\par \par All questions and concerns were discussed.

## 2022-01-10 NOTE — HISTORY OF PRESENT ILLNESS
[N] : Patient does not use contraception [Y] : Positive pregnancy history [Menarche Age: ____] : age at menarche was [unfilled] [Currently Active] : currently active [Men] : men [Vaginal] : vaginal [No] : No [Mammogramdate] : 1/30/21 [TextBox_19] : BR2 [PapSmeardate] : 10/29/20 [TextBox_31] : NEG [HPVDate] : 10/29/20 [LMPDate] : 10/20/21 [PGHxTotal] : 4 [[facility] : 2 [[facility] : 2 [PGHxABInduced] : 2 [FreeTextEntry1] : 10/20/21

## 2022-01-17 ENCOUNTER — APPOINTMENT (OUTPATIENT)
Dept: OBGYN | Facility: AMBULATORY SURGERY CENTER | Age: 47
End: 2022-01-17
Payer: COMMERCIAL

## 2022-01-17 ENCOUNTER — RESULT REVIEW (OUTPATIENT)
Age: 47
End: 2022-01-17

## 2022-01-17 PROCEDURE — 58558 HYSTEROSCOPY BIOPSY: CPT

## 2022-01-31 ENCOUNTER — APPOINTMENT (OUTPATIENT)
Dept: OBGYN | Facility: CLINIC | Age: 47
End: 2022-01-31
Payer: COMMERCIAL

## 2022-01-31 VITALS
SYSTOLIC BLOOD PRESSURE: 112 MMHG | WEIGHT: 163 LBS | BODY MASS INDEX: 32.86 KG/M2 | DIASTOLIC BLOOD PRESSURE: 72 MMHG | HEIGHT: 59 IN | TEMPERATURE: 97.4 F

## 2022-01-31 DIAGNOSIS — Z98.890 OTHER SPECIFIED POSTPROCEDURAL STATES: ICD-10-CM

## 2022-01-31 PROCEDURE — 99213 OFFICE O/P EST LOW 20 MIN: CPT

## 2022-01-31 NOTE — HISTORY OF PRESENT ILLNESS
[Gonorrhea test offered] : Gonorrhea test offered [Chlamydia test offered] : Chlamydia test offered [N] : Patient reports normal menses [Menarche Age: ____] : age at menarche was [unfilled] [Y] : Patient uses contraception [Currently Active] : currently active [Men] : men [Vaginal] : vaginal [No] : No [Yes] : Yes [Condoms] : Condoms [Patient refuses STI testing] : Patient refuses STI testing [Mammogramdate] : 01/30/21 [TextBox_19] : BR2 [PapSmeardate] : 10/29/21 [TextBox_31] : NEG [GonorrheaDate] : 10/29/21 [TextBox_63] : NEG [ChlamydiaDate] : 10/29/21 [TextBox_68] : NEG [LMPDate] : 01/14/22 [PGHxTotal] : 2 [PGHxAbortions] : 2 [[facility] CenterxLiving] : 0 [FreeTextEntry1] : 01/14/22

## 2022-01-31 NOTE — PHYSICAL EXAM
[Chaperone Present] : A chaperone was present in the examining room during all aspects of the physical examination [Appropriately responsive] : appropriately responsive [Alert] : alert [No Acute Distress] : no acute distress [Oriented x3] : oriented x3 [Labia Majora] : normal [Labia Minora] : normal [Normal] : normal [Pink Rugae] : pink rugae [Discharge] : a  ~M vaginal discharge was present [White] : white [No Bleeding] : There was no active vaginal bleeding [Soft] : soft [Non-tender] : non-tender [Non-distended] : non-distended [No Mass] : no mass [FreeTextEntry1] : MELVIN Parks [Foul Smelling] : not foul smelling

## 2022-01-31 NOTE — DISCUSSION/SUMMARY
[FreeTextEntry1] : -Patient is doing well from her D&C hysteroscopy, polypectomy at [facility] on 1/17/22. We reviewed her benign pathology: "Fragments of endometrial polyp. Background secretory endometrium. Benign endocervical tissue." Results were discussed.\par \par -Vaginal odor- On exam, thin white vaginal discharge noted. Affirm collected to r/o vaginitis. \par \par -h/o elevated Prolactin in 10/2021- She has not had her Prolactin repeated yet. Recommended repeat Prolactin soon. Fasting instructions given. She will be given lab requisition and list of [facility] labs at the  during check out.\par \par -JACKIE symptoms- Recommended Kegel's exercises. Recommended consultation with Urogyn. Dr. Perkins's contact information was given.\par \par -She is due for her screening mammogram. Last mammogram was in 1/2021. Recommended that she schedule her mammogram soon.\par \par -Follow up in 10/2022 for her annual GYN exam or PRN.\par \par All questions and concerns were discussed.

## 2022-02-02 LAB
CANDIDA VAG CYTO: NOT DETECTED
G VAGINALIS+PREV SP MTYP VAG QL MICRO: NOT DETECTED
T VAGINALIS VAG QL WET PREP: NOT DETECTED

## 2022-02-02 NOTE — HISTORY OF PRESENT ILLNESS
[Patient reported PAP Smear was normal] : Patient reported PAP Smear was normal [Gonorrhea test offered] : Gonorrhea test offered [Chlamydia test offered] : Chlamydia test offered [HPV test offered] : HPV test offered [N] : Patient does not use contraception [Y] : Positive pregnancy history [Menarche Age: ____] : age at menarche was [unfilled] [Currently Active] : currently active [Men] : men [No] : No [TextBox_4] : Pt presents today for a Sonogram Follow up. [PapSmeardate] : 10/29/2021 [GonorrheaDate] : 10/29/2021 [TextBox_63] : Negative [ChlamydiaDate] : 10/29/2021 [TextBox_68] : Negative [HPVDate] : 10/29/2021 [TextBox_78] : Negative [LMPDate] : 10/20/2021 [PGHxTotal] : 2 [PGHxAbortions] : 2 [[facility]xLiving] : 0 [FreeTextEntry1] : 10/20/2021

## 2022-02-02 NOTE — PHYSICAL EXAM
[Chaperone Present] : A chaperone was present in the examining room during all aspects of the physical examination [Appropriately responsive] : appropriately responsive [Alert] : alert [No Acute Distress] : no acute distress [Oriented x3] : oriented x3 [Labia Majora] : normal on the right [Labia Minora] : normal on the right [Normal] : normal [Pink Rugae] : pink rugae [Discharge] : a  ~M vaginal discharge was present [White] : white [Thick] : thick [No Bleeding] : There was no active vaginal bleeding [Foul Smelling] : not foul smelling [FreeTextEntry2] : 5 mm left labial lesion at 1 o'clock- HSV culture collected.

## 2022-02-02 NOTE — DISCUSSION/SUMMARY
[FreeTextEntry1] : -h/o uterine leiomyoma with heavy menses-\par 1) AUB labs were reviewed and significant for anemia and elevated Prolactin. Results were discussed. Recommended taking iron supplementation OTC. Will need to repeat Prolactin fasting- lab ordered. Patient will have Prolactin collected at [facility] outpatient lab.\par 2) Pelvic sono today: Retroverted uterus. 2 uterine leiomyomas visualized (Subserosal posterior left fundal measuring 2.18 cm, Subserosal posterior right fundal measuring 1.4 cm). EMS: 10.62 cm. Suspected 8 mm endometrial polyp. Normal appearing right ovary. Left ovary with follicular cyst measuring 1.7 cm. Results were discussed.\par 3) We discussed proceeding with endometrial sampling to r/o endometrial hyperplasia/cancer and performing polypectomy of endometrial polyp. Hysteroscopy, endometrial biopsy, and polypectomy were discussed. Procedure details, r/b/a were discussed. Risks discussed include but are not limited to pain, bleeding, infection, uterine perforation and injury to nearby organs. We discussed her options of having an in office hysteroscopy, endometrial biopsy, polypectomy without anesthesia versus dilation and curettage with hysteroscopy, polypectomy at [facility] with anesthesia. Patient desires to proceed with the procedure under anesthesia. Task sent for scheduling. She was given our surgical scheduler, Ariadne's contact information.\par \par -Vaginal discharge with itching-\par 1) Affirm collected to r/o vaginitis. \par 2) Patient desires treatment prior to receiving results since she is symptomatic. Rx Fluconazole 150 mg PO and Lotrisone were prescribed. Instructions were reviewed.\par 3) We discussed vulvar hygiene. Recommended using unscented and undyed soaps, detergents, and lotions. Also recommended wearing cotton underwear and loose fitting clothing.\par \par -5 mm left labial lesion at 1 o'clock- HSV PCR collected. 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 Will re-examine at next visit.\par \par -Will be scheduled for D&C hysteroscopy, polypectomy.\par \par All questions and concerns were discussed.

## 2022-02-09 ENCOUNTER — NON-APPOINTMENT (OUTPATIENT)
Age: 47
End: 2022-02-09

## 2022-02-09 LAB — PROLACTIN SERPL-MCNC: 22.3 NG/ML

## 2022-02-10 ENCOUNTER — OUTPATIENT (OUTPATIENT)
Dept: OUTPATIENT SERVICES | Facility: HOSPITAL | Age: 47
LOS: 1 days | End: 2022-02-10
Payer: COMMERCIAL

## 2022-02-10 ENCOUNTER — RESULT REVIEW (OUTPATIENT)
Age: 47
End: 2022-02-10

## 2022-02-10 ENCOUNTER — APPOINTMENT (OUTPATIENT)
Dept: MAMMOGRAPHY | Facility: CLINIC | Age: 47
End: 2022-02-10
Payer: COMMERCIAL

## 2022-02-10 DIAGNOSIS — Z12.31 ENCOUNTER FOR SCREENING MAMMOGRAM FOR MALIGNANT NEOPLASM OF BREAST: ICD-10-CM

## 2022-02-10 PROCEDURE — 77063 BREAST TOMOSYNTHESIS BI: CPT | Mod: 26

## 2022-02-10 PROCEDURE — 77067 SCR MAMMO BI INCL CAD: CPT

## 2022-02-10 PROCEDURE — 77067 SCR MAMMO BI INCL CAD: CPT | Mod: 26

## 2022-02-10 PROCEDURE — 77063 BREAST TOMOSYNTHESIS BI: CPT

## 2022-02-15 ENCOUNTER — NON-APPOINTMENT (OUTPATIENT)
Age: 47
End: 2022-02-15

## 2022-02-15 ENCOUNTER — APPOINTMENT (OUTPATIENT)
Dept: PULMONOLOGY | Facility: CLINIC | Age: 47
End: 2022-02-15
Payer: COMMERCIAL

## 2022-02-15 VITALS — RESPIRATION RATE: 16 BRPM | HEART RATE: 79 BPM | OXYGEN SATURATION: 98 %

## 2022-02-15 VITALS — BODY MASS INDEX: 31.91 KG/M2 | WEIGHT: 158 LBS

## 2022-02-15 DIAGNOSIS — E78.00 PURE HYPERCHOLESTEROLEMIA, UNSPECIFIED: ICD-10-CM

## 2022-02-15 DIAGNOSIS — Z80.0 FAMILY HISTORY OF MALIGNANT NEOPLASM OF DIGESTIVE ORGANS: ICD-10-CM

## 2022-02-15 DIAGNOSIS — Z78.9 OTHER SPECIFIED HEALTH STATUS: ICD-10-CM

## 2022-02-15 PROCEDURE — 99204 OFFICE O/P NEW MOD 45 MIN: CPT

## 2022-02-15 RX ORDER — FLUCONAZOLE 150 MG/1
150 TABLET ORAL
Qty: 2 | Refills: 0 | Status: COMPLETED | COMMUNITY
Start: 2021-11-15 | End: 2022-02-15

## 2022-02-15 RX ORDER — CLOTRIMAZOLE AND BETAMETHASONE DIPROPIONATE 10; .5 MG/G; MG/G
1-0.05 CREAM TOPICAL TWICE DAILY
Qty: 1 | Refills: 0 | Status: COMPLETED | COMMUNITY
Start: 2021-11-15 | End: 2022-02-15

## 2022-02-15 NOTE — CONSULT LETTER
[Dear  ___] : Dear  [unfilled], [Consult Letter:] : I had the pleasure of evaluating your patient, [unfilled]. [Please see my note below.] : Please see my note below. [Sincerely,] : Sincerely, [FreeTextEntry3] : Suhas Boyer MD FCCP\par Pulmonary/Critical Care/Sleep Medicine\par Department of Internal Medicine\par \par [facility]\par

## 2022-02-15 NOTE — DISCUSSION/SUMMARY
[FreeTextEntry1] : 46-year-old female seen today for the above. Patient's complaints of chronic rhinitis/sinusitis may be precipitating some degree of reactive airways during significant exacerbations. She was counseled on the importance of using nasal steroids antihistamines as well as nasal saline rinse on a regular basis. Her present complaints may be consistent with asthma as seen by her response to steroids although any degree of airway inflammation will respond to the same. CBC as well as IgE levels have been sent and the patient has been advised to continue her Arnuity and liberalize the use of her albuterol. Full pulmonary function test will be performed. I feel the patient may have either reactive airways disease, mild persistent asthma and possible silent reflux.

## 2022-02-15 NOTE — HISTORY OF PRESENT ILLNESS
[Never] : never [TextBox_4] : 46-year-old female who describes himself as being anxious non-smoker seen today for complaints of chest tightness and shortness of breath. Patient does suffer from chronic seasonal allergies with itchy eyes runny nose and scratchy throat. She is treated twice a year by you for "bronchitis". Most recently she has had a recurrent episode which is prolonged. She describes this as first starting with complaints of sinus headache postnasal drip and wheezing which was prolonged and eventually treated with a tapering course of steroids with significant improvement. She has had residual shortness of breath with activity as well as chest tightness. She does admit to anxiety but feels that this is episodic. An example was recent family gathering where she became very short of breath despite having a great time. She was seen by a pulmonary group in [place] where accordingly pulmonary function tests were performed. Patient was given a provisional diagnosis of asthma and treated with Arnuity as well as as needed albuterol. She did institute Arnuity with some improvement in her shortness of breath. She has not used albuterol secondary to the fear of worsening tremulousness. There is no history of heartburn, leg edema, paroxysmal nocturnal dyspnea or orthopnea. She has had a new experience in getting a cat to her home 6 months ago.

## 2022-03-01 LAB
ALBUMIN SERPL ELPH-MCNC: 4.5 G/DL
ALP BLD-CCNC: 79 U/L
ALT SERPL-CCNC: 18 U/L
ANION GAP SERPL CALC-SCNC: 12 MMOL/L
AST SERPL-CCNC: 15 U/L
BASOPHILS # BLD AUTO: 0.02 K/UL
BASOPHILS NFR BLD AUTO: 0.3 %
BILIRUB SERPL-MCNC: 0.6 MG/DL
BUN SERPL-MCNC: 17 MG/DL
CALCIUM SERPL-MCNC: 9.2 MG/DL
CHLORIDE SERPL-SCNC: 101 MMOL/L
CO2 SERPL-SCNC: 23 MMOL/L
CREAT SERPL-MCNC: 0.73 MG/DL
EOSINOPHIL # BLD AUTO: 0.02 K/UL
EOSINOPHIL NFR BLD AUTO: 0.3 %
GLUCOSE SERPL-MCNC: 101 MG/DL
HCT VFR BLD CALC: 37.8 %
HGB BLD-MCNC: 12 G/DL
IMM GRANULOCYTES NFR BLD AUTO: 0.4 %
LYMPHOCYTES # BLD AUTO: 2.45 K/UL
LYMPHOCYTES NFR BLD AUTO: 31.4 %
MAN DIFF?: NORMAL
MCHC RBC-ENTMCNC: 27.5 PG
MCHC RBC-ENTMCNC: 31.7 GM/DL
MCV RBC AUTO: 86.7 FL
MONOCYTES # BLD AUTO: 0.61 K/UL
MONOCYTES NFR BLD AUTO: 7.8 %
NEUTROPHILS # BLD AUTO: 4.67 K/UL
NEUTROPHILS NFR BLD AUTO: 59.8 %
PLATELET # BLD AUTO: 212 K/UL
POTASSIUM SERPL-SCNC: 3.9 MMOL/L
PROT SERPL-MCNC: 7.1 G/DL
RBC # BLD: 4.36 M/UL
RBC # FLD: 16 %
SARS-COV-2 N GENE NPH QL NAA+PROBE: NOT DETECTED
SODIUM SERPL-SCNC: 135 MMOL/L
WBC # FLD AUTO: 7.8 K/UL

## 2022-03-03 ENCOUNTER — APPOINTMENT (OUTPATIENT)
Dept: PULMONOLOGY | Facility: CLINIC | Age: 47
End: 2022-03-03
Payer: COMMERCIAL

## 2022-03-03 VITALS
SYSTOLIC BLOOD PRESSURE: 120 MMHG | HEART RATE: 68 BPM | OXYGEN SATURATION: 98 % | DIASTOLIC BLOOD PRESSURE: 75 MMHG | RESPIRATION RATE: 16 BRPM

## 2022-03-03 VITALS — HEIGHT: 59 IN | WEIGHT: 157 LBS | BODY MASS INDEX: 31.65 KG/M2

## 2022-03-03 PROCEDURE — 94729 DIFFUSING CAPACITY: CPT

## 2022-03-03 PROCEDURE — 99214 OFFICE O/P EST MOD 30 MIN: CPT | Mod: 25

## 2022-03-03 PROCEDURE — 94010 BREATHING CAPACITY TEST: CPT

## 2022-03-03 PROCEDURE — 85018 HEMOGLOBIN: CPT | Mod: QW

## 2022-03-03 PROCEDURE — 94727 GAS DIL/WSHOT DETER LNG VOL: CPT

## 2022-03-03 NOTE — DISCUSSION/SUMMARY
[Asthma] : asthma [Stable] : stable [Mild Persistent] : mild persistent [None] : There are no changes in medication management

## 2022-03-03 NOTE — HISTORY OF PRESENT ILLNESS
[Never] : never [Mild Persistent] : mild persistent [Doing Well] : doing well [Well Controlled] : Well controlled [None] : None [Adherent] : the patient is adherent with ~his/her~ medication regimen [TextBox_4] : \par

## 2022-03-03 NOTE — CONSULT LETTER
[Dear  ___] : Dear  [unfilled], [Consult Letter:] : I had the pleasure of evaluating your patient, [unfilled]. [Please see my note below.] : Please see my note below. [Sincerely,] : Sincerely, [FreeTextEntry3] : Suhas Boyer MD FCCP\par Pulmonary/Critical Care/Sleep Medicine\par Department of Internal Medicine\par \par [facility]\par

## 2022-03-07 LAB — TOTAL IGE SMQN RAST: 201 KU/L

## 2022-03-21 ENCOUNTER — RX RENEWAL (OUTPATIENT)
Age: 47
End: 2022-03-21

## 2022-05-18 ENCOUNTER — APPOINTMENT (OUTPATIENT)
Dept: OBGYN | Facility: CLINIC | Age: 47
End: 2022-05-18
Payer: COMMERCIAL

## 2022-05-18 VITALS
HEIGHT: 59 IN | DIASTOLIC BLOOD PRESSURE: 68 MMHG | BODY MASS INDEX: 31.85 KG/M2 | SYSTOLIC BLOOD PRESSURE: 122 MMHG | WEIGHT: 158 LBS

## 2022-05-18 PROCEDURE — 81003 URINALYSIS AUTO W/O SCOPE: CPT | Mod: NC,QW

## 2022-05-18 PROCEDURE — 99213 OFFICE O/P EST LOW 20 MIN: CPT

## 2022-05-19 LAB
BILIRUB UR QL STRIP: NORMAL
GLUCOSE UR-MCNC: NORMAL
HCG UR QL: 0.2 EU/DL
HGB UR QL STRIP.AUTO: ABNORMAL
KETONES UR-MCNC: NORMAL
LEUKOCYTE ESTERASE UR QL STRIP: NORMAL
NITRITE UR QL STRIP: NORMAL
PH UR STRIP: 6.5
PROT UR STRIP-MCNC: NORMAL
SP GR UR STRIP: 1.01

## 2022-05-19 NOTE — REVIEW OF SYSTEMS
[Patient Intake Form Reviewed] : Patient intake form was reviewed [FreeTextEntry8] : Vaginal discharge and itching

## 2022-05-19 NOTE — PHYSICAL EXAM
[Chaperone Present] : A chaperone was present in the examining room during all aspects of the physical examination [Appropriately responsive] : appropriately responsive [Alert] : alert [No Acute Distress] : no acute distress [Oriented x3] : oriented x3 [Labia Majora] : normal [Labia Minora] : normal [Pink Rugae] : pink rugae [Discharge] : a  ~M vaginal discharge was present [White] : white [Thick] : thick [No Bleeding] : There was no active vaginal bleeding [Normal] : normal [FreeTextEntry1] : MELVIN RODRIGUEZ [Foul Smelling] : not foul smelling

## 2022-05-19 NOTE — DISCUSSION/SUMMARY
[FreeTextEntry1] : -Vaginal discharge with itching-\par 1) On exam, thick white vaginal discharge noted.\par 2) GC/Chl and Affirm collected to r/o vaginitis.\par 3) Likely vaginal candidiasis. Patient desires to be treated prior to receiving results since she is symptomatic. Rx Fluconazole PO prescribed. She states that she has Lotrisone cream at home- Instructions were reviewed.\par 4) We discussed vulvar hygiene. Recommended using unscented and undyed soaps, detergents, and lotions. Also recommended wearing cotton underwear and loose fitting clothing.\par \par -Last annual GYN exam was in 10/2021.\par \par -Follow up in 10/2022 for annual GYN exam or PRN.\par \par All questions and concerns were discussed.

## 2022-05-19 NOTE — HISTORY OF PRESENT ILLNESS
[N] : Patient reports normal menses [Condoms] : uses condoms [Y] : Positive pregnancy history [Menarche Age: ____] : age at menarche was [unfilled] [Currently Active] : currently active [Mammogramdate] : 02/10/22 [TextBox_19] : BR2 [BreastSonogramDate] : 03/26/20 [TextBox_25] : R BREAST BR2  [PapSmeardate] : 10/29/21 [TextBox_31] : NEG [GonorrheaDate] : 10/29/21 [TextBox_63] : NEG [ChlamydiaDate] : 10/29/21 [TextBox_68] : NEG [TrichomonasDate] : 10/29/21 [TextBox_73] : NEG [HPVDate] : 10/29/21 [LMPDate] : 05/03/22 [TextBox_78] : NEG [PGHxTotal] : 2 [PGHxAbortions] : 2 [facility] : [facility] [FreeTextEntry1] : 05/03/22

## 2022-05-24 ENCOUNTER — NON-APPOINTMENT (OUTPATIENT)
Age: 47
End: 2022-05-24

## 2022-05-24 LAB
C TRACH RRNA SPEC QL NAA+PROBE: NOT DETECTED
N GONORRHOEA RRNA SPEC QL NAA+PROBE: NOT DETECTED
SOURCE AMPLIFICATION: NORMAL

## 2022-06-01 LAB
CANDIDA VAG CYTO: DETECTED
G VAGINALIS+PREV SP MTYP VAG QL MICRO: NOT DETECTED
T VAGINALIS VAG QL WET PREP: NOT DETECTED

## 2022-06-17 ENCOUNTER — APPOINTMENT (OUTPATIENT)
Dept: PULMONOLOGY | Facility: CLINIC | Age: 47
End: 2022-06-17

## 2022-12-30 ENCOUNTER — APPOINTMENT (OUTPATIENT)
Dept: OBGYN | Facility: CLINIC | Age: 47
End: 2022-12-30
Payer: COMMERCIAL

## 2022-12-30 VITALS
BODY MASS INDEX: 30.84 KG/M2 | DIASTOLIC BLOOD PRESSURE: 82 MMHG | HEIGHT: 59 IN | SYSTOLIC BLOOD PRESSURE: 128 MMHG | WEIGHT: 153 LBS

## 2022-12-30 DIAGNOSIS — Z12.31 ENCOUNTER FOR SCREENING MAMMOGRAM FOR MALIGNANT NEOPLASM OF BREAST: ICD-10-CM

## 2022-12-30 DIAGNOSIS — Z92.89 PERSONAL HISTORY OF OTHER MEDICAL TREATMENT: ICD-10-CM

## 2022-12-30 DIAGNOSIS — Z01.419 ENCOUNTER FOR GYNECOLOGICAL EXAMINATION (GENERAL) (ROUTINE) W/OUT ABNORMAL FINDINGS: ICD-10-CM

## 2022-12-30 DIAGNOSIS — Z87.42 PERSONAL HISTORY OF OTHER DISEASES OF THE FEMALE GENITAL TRACT: ICD-10-CM

## 2022-12-30 DIAGNOSIS — N89.8 OTHER SPECIFIED NONINFLAMMATORY DISORDERS OF VAGINA: ICD-10-CM

## 2022-12-30 DIAGNOSIS — N76.0 ACUTE VAGINITIS: ICD-10-CM

## 2022-12-30 DIAGNOSIS — B96.89 ACUTE VAGINITIS: ICD-10-CM

## 2022-12-30 DIAGNOSIS — Z11.3 ENCOUNTER FOR SCREENING FOR INFECTIONS WITH A PREDOMINANTLY SEXUAL MODE OF TRANSMISSION: ICD-10-CM

## 2022-12-30 LAB
DATE COLLECTED: NORMAL
HEMOCCULT SP1 STL QL: NEGATIVE

## 2022-12-30 PROCEDURE — 99396 PREV VISIT EST AGE 40-64: CPT

## 2022-12-30 PROCEDURE — 82270 OCCULT BLOOD FECES: CPT

## 2022-12-30 RX ORDER — METRONIDAZOLE 7.5 MG/G
0.75 GEL VAGINAL
Qty: 1 | Refills: 0 | Status: DISCONTINUED | COMMUNITY
Start: 2021-11-17 | End: 2022-12-30

## 2022-12-30 RX ORDER — EZETIMIBE 10 MG/1
10 TABLET ORAL
Refills: 0 | Status: ACTIVE | COMMUNITY

## 2022-12-30 RX ORDER — FLUCONAZOLE 150 MG/1
150 TABLET ORAL
Qty: 2 | Refills: 0 | Status: DISCONTINUED | COMMUNITY
Start: 2022-05-18 | End: 2022-12-30

## 2023-01-02 PROBLEM — Z87.42 HISTORY OF MENORRHAGIA: Status: RESOLVED | Noted: 2021-10-29 | Resolved: 2023-01-02

## 2023-01-02 PROBLEM — Z11.3 ROUTINE SCREENING FOR STI (SEXUALLY TRANSMITTED INFECTION): Status: RESOLVED | Noted: 2021-10-29 | Resolved: 2023-01-02

## 2023-01-02 LAB — HPV HIGH+LOW RISK DNA PNL CVX: NOT DETECTED

## 2023-01-02 NOTE — HISTORY OF PRESENT ILLNESS
[N] : Patient reports normal menses [Condoms] : uses condoms [Y] : Positive pregnancy history [Menarche Age: ____] : age at menarche was [unfilled] [Currently Active] : currently active [Mammogramdate] : 2/10/22 [TextBox_19] : BR2 [PapSmeardate] : 10/29/21 [TextBox_31] : NEG [ColonoscopyDate] : NEVER [GonorrheaDate] : 5/18/22 [TextBox_63] : NEG [TextBox_68] : NEG [ChlamydiaDate] : 5/18/22 [HPVDate] : 10/29/21 [TextBox_78] : NEG [PGHxTotal] : 2 [LMPDate] : 12/8/22 [PGHxABInduced] : 2 [FreeTextEntry1] : 12/8/22

## 2023-01-02 NOTE — DISCUSSION/SUMMARY
[FreeTextEntry1] : - Pap done today\par - Self-breast exam reviewed\par - Mammogram ordered\par - Discussed ACS guidelines for colonoscopy at 47\par - Patient will follow up in one year for annual gynecology exam or as needed\par \par All questions and concerns were addressed.\par

## 2023-01-02 NOTE — PHYSICAL EXAM
[Appropriately responsive] : appropriately responsive [Alert] : alert [No Acute Distress] : no acute distress [Soft] : soft [Non-tender] : non-tender [Non-distended] : non-distended [No Mass] : no mass [Oriented x3] : oriented x3 [Examination Of The Breasts] : a normal appearance [No Masses] : no breast masses were palpable [Labia Majora] : normal [Labia Minora] : normal [Normal] : normal [Uterine Adnexae] : normal [Chaperone Present] : A chaperone was present in the examining room during all aspects of the physical examination [Normal rectal exam] : was normal [Occult Blood Positive] : was negative for occult blood analysis [Gross Blood] : no gross blood

## 2023-01-09 LAB — CYTOLOGY CVX/VAG DOC THIN PREP: ABNORMAL

## 2023-05-02 NOTE — ED PROVIDER NOTE - PRO INTERPRETER NEED 2
----- Message from Jake Jones MD sent at 5/1/2023 11:30 AM CDT -----  Alk phos isoenzymes shows elevated liver fraction. US RUQ would be recommended to further eval.    Left message to call back.   Sent live well message.   
[facility]    Patient advised to call and schedule  
English

## 2023-08-12 ENCOUNTER — RESULT REVIEW (OUTPATIENT)
Age: 48
End: 2023-08-12

## 2023-08-12 ENCOUNTER — OUTPATIENT (OUTPATIENT)
Dept: OUTPATIENT SERVICES | Facility: HOSPITAL | Age: 48
LOS: 1 days | End: 2023-08-12
Payer: COMMERCIAL

## 2023-08-12 ENCOUNTER — APPOINTMENT (OUTPATIENT)
Dept: MAMMOGRAPHY | Facility: CLINIC | Age: 48
End: 2023-08-12
Payer: COMMERCIAL

## 2023-08-12 DIAGNOSIS — Z00.8 ENCOUNTER FOR OTHER GENERAL EXAMINATION: ICD-10-CM

## 2023-08-12 PROCEDURE — 77067 SCR MAMMO BI INCL CAD: CPT | Mod: 26

## 2023-08-12 PROCEDURE — 77063 BREAST TOMOSYNTHESIS BI: CPT

## 2023-08-12 PROCEDURE — 77067 SCR MAMMO BI INCL CAD: CPT

## 2023-08-12 PROCEDURE — 77063 BREAST TOMOSYNTHESIS BI: CPT | Mod: 26

## 2023-08-13 ENCOUNTER — TRANSCRIPTION ENCOUNTER (OUTPATIENT)
Age: 48
End: 2023-08-13

## 2023-08-28 ENCOUNTER — APPOINTMENT (OUTPATIENT)
Dept: PULMONOLOGY | Facility: CLINIC | Age: 48
End: 2023-08-28
Payer: COMMERCIAL

## 2023-08-28 VITALS
BODY MASS INDEX: 30.64 KG/M2 | SYSTOLIC BLOOD PRESSURE: 122 MMHG | OXYGEN SATURATION: 98 % | RESPIRATION RATE: 16 BRPM | HEIGHT: 59 IN | HEART RATE: 72 BPM | DIASTOLIC BLOOD PRESSURE: 74 MMHG | WEIGHT: 152 LBS

## 2023-08-28 PROCEDURE — 99214 OFFICE O/P EST MOD 30 MIN: CPT

## 2023-08-28 NOTE — HISTORY OF PRESENT ILLNESS
[Mild Persistent] : mild persistent [Doing Well] : doing well [Well Controlled] : Well controlled [Cough at Night] : not awakened at night with cough [Wheezing at Night] : not awakened at night because of wheezing or trouble breathing [None] : None [Adherent] : the patient is adherent with ~his/her~ medication regimen [TextBox_4] : \par

## 2023-08-28 NOTE — CONSULT LETTER
[Dear  ___] : Dear  [unfilled], [Consult Letter:] : I had the pleasure of evaluating your patient, [unfilled]. [Please see my note below.] : Please see my note below. [Sincerely,] : Sincerely, [FreeTextEntry3] : Suhas Boyer MD FCCP\par  Pulmonary/Critical Care/Sleep Medicine\par  Department of Internal Medicine\par  \par  [facility] [employer]\par

## 2023-12-06 ENCOUNTER — OFFICE (OUTPATIENT)
Dept: URBAN - METROPOLITAN AREA CLINIC 104 | Facility: CLINIC | Age: 48
Setting detail: OPHTHALMOLOGY
End: 2023-12-06
Payer: COMMERCIAL

## 2023-12-06 DIAGNOSIS — H01.005: ICD-10-CM

## 2023-12-06 DIAGNOSIS — H01.002: ICD-10-CM

## 2023-12-06 DIAGNOSIS — H43.393: ICD-10-CM

## 2023-12-06 DIAGNOSIS — Z79.891: ICD-10-CM

## 2023-12-06 PROBLEM — H52.7 REFRACTIVE ERROR: Status: ACTIVE | Noted: 2023-12-06

## 2023-12-06 PROCEDURE — 92014 COMPRE OPH EXAM EST PT 1/>: CPT | Performed by: OPTOMETRIST

## 2023-12-06 ASSESSMENT — REFRACTION_AUTOREFRACTION
OD_CYLINDER: -0.75
OD_SPHERE: +1.25
OS_SPHERE: +0.75
OS_CYLINDER: -0.75
OS_AXIS: 150
OD_AXIS: 063

## 2023-12-06 ASSESSMENT — LID EXAM ASSESSMENTS
OS_BLEPHARITIS: LLL T
OD_BLEPHARITIS: RLL T

## 2023-12-06 ASSESSMENT — REFRACTION_MANIFEST
OD_VA1: 20/20
OS_AXIS: 150
OD_AXIS: 065
OS_VA1: 20/20
OD_SPHERE: +1.25
OS_CYLINDER: -0.25
OD_CYLINDER: -0.25
OS_AXIS: 148
OS_SPHERE: +1.00
OD_AXIS: 046
OS_CYLINDER: -0.25
OD_SPHERE: +1.00
OD_CYLINDER: -0.25
OS_SPHERE: +0.50

## 2023-12-06 ASSESSMENT — SPHEQUIV_DERIVED
OD_SPHEQUIV: 0.875
OD_SPHEQUIV: 1.125
OS_SPHEQUIV: 0.375
OS_SPHEQUIV: 0.375
OD_SPHEQUIV: 0.875
OS_SPHEQUIV: 0.875

## 2023-12-06 ASSESSMENT — REFRACTION_CURRENTRX
OD_OVR_VA: 20/
OS_SPHERE: +1.00
OS_AXIS: 148
OS_OVR_VA: 20/
OD_AXIS: 046
OD_CYLINDER: -0.25
OS_CYLINDER: -0.25
OS_VPRISM_DIRECTION: SV
OD_VPRISM_DIRECTION: SV
OD_SPHERE: +1.25

## 2023-12-06 ASSESSMENT — CONFRONTATIONAL VISUAL FIELD TEST (CVF)
OD_FINDINGS: FULL
OS_FINDINGS: FULL

## 2024-01-02 ENCOUNTER — APPOINTMENT (OUTPATIENT)
Dept: OBGYN | Facility: CLINIC | Age: 49
End: 2024-01-02
Payer: COMMERCIAL

## 2024-01-02 ENCOUNTER — NON-APPOINTMENT (OUTPATIENT)
Age: 49
End: 2024-01-02

## 2024-01-02 VITALS
WEIGHT: 157 LBS | SYSTOLIC BLOOD PRESSURE: 124 MMHG | BODY MASS INDEX: 31.65 KG/M2 | HEIGHT: 59 IN | DIASTOLIC BLOOD PRESSURE: 80 MMHG

## 2024-01-02 DIAGNOSIS — Z12.39 ENCOUNTER FOR OTHER SCREENING FOR MALIGNANT NEOPLASM OF BREAST: ICD-10-CM

## 2024-01-02 DIAGNOSIS — R79.89 OTHER SPECIFIED ABNORMAL FINDINGS OF BLOOD CHEMISTRY: ICD-10-CM

## 2024-01-02 DIAGNOSIS — Z12.31 ENCOUNTER FOR SCREENING MAMMOGRAM FOR MALIGNANT NEOPLASM OF BREAST: ICD-10-CM

## 2024-01-02 DIAGNOSIS — Z01.419 ENCOUNTER FOR GYNECOLOGICAL EXAMINATION (GENERAL) (ROUTINE) W/OUT ABNORMAL FINDINGS: ICD-10-CM

## 2024-01-02 DIAGNOSIS — B00.9 HERPESVIRAL INFECTION, UNSPECIFIED: ICD-10-CM

## 2024-01-02 DIAGNOSIS — Z78.9 OTHER SPECIFIED HEALTH STATUS: ICD-10-CM

## 2024-01-02 DIAGNOSIS — N90.89 OTHER SPECIFIED NONINFLAMMATORY DISORDERS OF VULVA AND PERINEUM: ICD-10-CM

## 2024-01-02 DIAGNOSIS — Z87.42 PERSONAL HISTORY OF OTHER DISEASES OF THE FEMALE GENITAL TRACT: ICD-10-CM

## 2024-01-02 DIAGNOSIS — N89.8 OTHER SPECIFIED NONINFLAMMATORY DISORDERS OF VAGINA: ICD-10-CM

## 2024-01-02 DIAGNOSIS — Z01.411 ENCOUNTER FOR GYNECOLOGICAL EXAMINATION (GENERAL) (ROUTINE) WITH ABNORMAL FINDINGS: ICD-10-CM

## 2024-01-02 DIAGNOSIS — Z12.11 ENCOUNTER FOR SCREENING FOR MALIGNANT NEOPLASM OF COLON: ICD-10-CM

## 2024-01-02 PROCEDURE — 99214 OFFICE O/P EST MOD 30 MIN: CPT | Mod: 25

## 2024-01-02 PROCEDURE — 99396 PREV VISIT EST AGE 40-64: CPT | Mod: 25

## 2024-01-02 RX ORDER — METRONIDAZOLE 7.5 MG/G
0.75 GEL VAGINAL
Qty: 1 | Refills: 1 | Status: ACTIVE | COMMUNITY
Start: 2024-01-02 | End: 1900-01-01

## 2024-01-02 RX ORDER — LEVALBUTEROL HYDROCHLORIDE 1.25 MG/3ML
1.25 SOLUTION RESPIRATORY (INHALATION) 3 TIMES DAILY
Qty: 1 | Refills: 3 | Status: DISCONTINUED | COMMUNITY
Start: 2022-03-03 | End: 2024-01-02

## 2024-01-02 NOTE — PHYSICAL EXAM
[Appropriately responsive] : appropriately responsive [Alert] : alert [No Acute Distress] : no acute distress [Soft] : soft [Non-tender] : non-tender [Non-distended] : non-distended [Oriented x3] : oriented x3 [Examination Of The Breasts] : a normal appearance [No Masses] : no breast masses were palpable [Labia Majora] : normal [Labia Minora] : normal [Discharge] : a  ~M vaginal discharge was present [Scant] : scant [White] : white [Frothy] : frothy [Normal] : normal [Uterine Adnexae] : normal [FreeTextEntry1] : Active herpetic blister on upper buttocks

## 2024-01-02 NOTE — PLAN
[FreeTextEntry1] : F/U pap and mammo.  She desires condoms only for birth control.  Rx sent for meteogel.  Active recurrent herpes. Rx sent for valtrex.  Encouraged screening colonoscopy.

## 2024-01-02 NOTE — HISTORY OF PRESENT ILLNESS
[HPV test offered] : HPV test offered [N] : Patient reports normal menses [Menarche Age: ____] : age at menarche was [unfilled] [Currently Active] : currently active [Men] : men [Ultrasound] : ultrasound [Y] : Patient uses contraception [Condoms] : uses condoms [Mammogramdate] : 08/12/2023 [TextBox_19] : BR2 [PapSmeardate] : 12/30/2022 [TextBox_31] : Negative [TextBox_43] : Pt never had a Colonoscopy.  [HPVDate] : 12/30/2022 [TextBox_78] : Negative [LMPDate] : 12/21/2023 [PGHxTotal] : 4 [[facility] : [facility] [PGHxAbortions] : 2 [[facility] 2 [TextBox_27] : Pelvic: 12/21/2023 [FreeTextEntry1] : 12/21/2023

## 2024-01-03 LAB — HPV HIGH+LOW RISK DNA PNL CVX: NOT DETECTED

## 2024-01-04 LAB — CYTOLOGY CVX/VAG DOC THIN PREP: NORMAL

## 2024-01-10 NOTE — ED ADULT NURSE REASSESSMENT NOTE - NS ED NURSE REASSESS COMMENT FT1
pt triaged, treated and dispo by provider, medicated by RN during staty.  pt verbalized understanding of discharge instructions,, refer to provider notes.
10-Tuan-2024 22:15

## 2024-01-15 ENCOUNTER — NON-APPOINTMENT (OUTPATIENT)
Age: 49
End: 2024-01-15

## 2024-01-15 ENCOUNTER — APPOINTMENT (OUTPATIENT)
Dept: CARDIOLOGY | Facility: CLINIC | Age: 49
End: 2024-01-15
Payer: COMMERCIAL

## 2024-01-15 VITALS
HEIGHT: 59 IN | RESPIRATION RATE: 12 BRPM | HEART RATE: 72 BPM | SYSTOLIC BLOOD PRESSURE: 146 MMHG | DIASTOLIC BLOOD PRESSURE: 90 MMHG | BODY MASS INDEX: 30.84 KG/M2 | WEIGHT: 153 LBS

## 2024-01-15 DIAGNOSIS — K21.9 GASTRO-ESOPHAGEAL REFLUX DISEASE W/OUT ESOPHAGITIS: ICD-10-CM

## 2024-01-15 PROCEDURE — 99204 OFFICE O/P NEW MOD 45 MIN: CPT | Mod: 25

## 2024-01-15 PROCEDURE — 93000 ELECTROCARDIOGRAM COMPLETE: CPT

## 2024-01-15 RX ORDER — ROSUVASTATIN CALCIUM 10 MG/1
10 TABLET, FILM COATED ORAL
Refills: 0 | Status: ACTIVE | COMMUNITY

## 2024-01-15 RX ORDER — VALACYCLOVIR 500 MG/1
500 TABLET, FILM COATED ORAL DAILY
Qty: 60 | Refills: 2 | Status: DISCONTINUED | COMMUNITY
Start: 2024-01-02 | End: 2024-01-15

## 2024-01-15 RX ORDER — PANTOPRAZOLE 40 MG/1
40 TABLET, DELAYED RELEASE ORAL DAILY
Qty: 90 | Refills: 3 | Status: ACTIVE | COMMUNITY
Start: 2024-01-15 | End: 1900-01-01

## 2024-01-15 NOTE — ASSESSMENT
[FreeTextEntry1] : ECG: Normal sinus rhythm at 72 with borderline leftward axis deviation motion artifact and no significant ST or T wave changes.  Impression 48-year-old premenopausal female with a BMI of 30 presenting with a nocturnal chest pain syndrome that is nonexertional and is associated with some reflux type burning into her throat.  GERD sounds exceedingly unlikely.  Cardiac risk factors include that of a family history of premature CAD, hyperlipidemia and elevated A1c without diagnosis of diabetes.  There is also a history of some varicosities and she anticipates going for an ablation procedure in the near future.  Blood pressure here today is elevated though typically is not so.  She questions the role of anxiety and whitecoat hypertension.  Plan: 1.  Advised eating a bland early dinner. 2.  Protonix 40 nightly. 3.  Obtain and review copies of her lipids metabolic panel and A1c 4.  Reassured the patient at some length 5.  Echocardiogram will be obtained in view of the dyspnea

## 2024-01-15 NOTE — REASON FOR VISIT
LC called to room to observe latch. Mother relaxed and feeding infant at left breast in cross cradle position, denies pain at breast. Pt reports gape isn't as wide as before, educated patient to bring infant closer to breast and latch widened. Infant actively sucking and audible swallows, encouraged breast compression. FOB to bring manual pump and pt's mother desires to start pumping, educated on pumping frequency and to always bring baby to breast before pumping. Questions answered and verbalized/demonstrated understanding.    [FreeTextEntry1] : 48-year-old premenopausal female presents for cardiac evaluation with concerns about chest discomfort of 2 weeks duration.  Symptoms are nonexertional, nonradiating and usually are nocturnal.  She also gets symptoms of heartburn with radiation of the symptoms up into her throat around that time.  Those symptoms have become more pronounced recently.  Patient does have history of hyperlipidemia.  Her A1c may be elevated. Father had premature CAD Denies hypertension, tobacco use.  The patient is a history of obesity and started Mounjaro 1 week ago.  She experiences mild exertional dyspnea which may be new.  Patient was seen in [facility] 9/22/2023 with complaints of palpitations and burning and bubbling chest discomfort.  She was discharged for outpatient follow-up.

## 2024-01-15 NOTE — PHYSICAL EXAM
[No Acute Distress] : no acute distress [Obese] : obese [Soft] : abdomen soft [Non Tender] : non-tender [Normal] : alert and oriented, normal memory [de-identified] : Normocephalic and atraumatic [de-identified] : Cardiac: Nl S1 S2 with a grade 1/6  GEOFF and no significant  gallops or rubs.

## 2024-01-29 ENCOUNTER — APPOINTMENT (OUTPATIENT)
Dept: CARDIOLOGY | Facility: CLINIC | Age: 49
End: 2024-01-29
Payer: COMMERCIAL

## 2024-01-29 PROCEDURE — 93306 TTE W/DOPPLER COMPLETE: CPT

## 2024-02-01 ENCOUNTER — APPOINTMENT (OUTPATIENT)
Dept: CARDIOLOGY | Facility: CLINIC | Age: 49
End: 2024-02-01
Payer: COMMERCIAL

## 2024-02-01 VITALS
WEIGHT: 153 LBS | RESPIRATION RATE: 14 BRPM | HEART RATE: 67 BPM | SYSTOLIC BLOOD PRESSURE: 166 MMHG | BODY MASS INDEX: 30.84 KG/M2 | DIASTOLIC BLOOD PRESSURE: 96 MMHG | HEIGHT: 59 IN

## 2024-02-01 DIAGNOSIS — I31.39 OTHER PERICARDIAL EFFUSION (NONINFLAMMATORY): ICD-10-CM

## 2024-02-01 DIAGNOSIS — R07.89 OTHER CHEST PAIN: ICD-10-CM

## 2024-02-01 DIAGNOSIS — R06.09 OTHER FORMS OF DYSPNEA: ICD-10-CM

## 2024-02-01 PROCEDURE — 99214 OFFICE O/P EST MOD 30 MIN: CPT

## 2024-02-01 NOTE — HISTORY OF PRESENT ILLNESS
[FreeTextEntry1] : Patient reports no recurrence of chest discomfort at night since starting PPI.   Reports "bad cold" in December exacerbating her asthma. Was treated with antibiotics and Prednisone by her PMD.   Does not exercise regularly but remains physcially active and has no exertional symptoms. She denies any chest pain, dizziness, syncope, near-syncope, SOB, edema, orthopnea or PND.  Started Zepbound for weight loss and to lower HgbA1C almost 3 weeks ago and tolerating this.

## 2024-02-01 NOTE — ASSESSMENT
[FreeTextEntry1] : ECHOCARDIOGRAM 1/2024: LVEF 60-65% No regional WMA Trace MR Trace TR mild to moderate PI Trace AI Trace pericardial effusion  IMPRESSION:  1. Nocturnal chest discomfort that occurred at rest not likely cardiac in nature. Resolved with PPI and possibly due to reflux and esophageal/bronchospasm.  --- Continue PPI. Avoid late meals.   2. Elevated blood pressure today however patient reports normal blood pressures at PMD visits and at home.  --- Advised to check home BP for 1 week and call our office with results.  --- Avoid salt.   3. Family hx of premature CAD and hx of HLD. Most recent LDL 98.  --- Follow a loow-fat, low-carbohydrate diet.  --- Repeat lipids through PMD.   4. Elevated HgbA1C. Taking Zepbound being managed by PMD.  --- Continue to follow a heart healthy diet consisting of more vegetables, leans meats, whole grains, nuts and fruits. Avoid trans fats, saturated fats and processed meats. --- Repeat labs through PMD.   5. Trace pericardial effusion. May be related to recent viral infection.  --- Repeat echocardiogram in 2 months to reassess.   Clinical follow-up in 6 months or sooner if needed.

## 2024-02-01 NOTE — PHYSICAL EXAM

## 2024-02-01 NOTE — REASON FOR VISIT
[FreeTextEntry1] : WESTON CALLES is a 48-year-old F presents here for cardiac follow-up.  She has a hx of HLD, asthma and elevated HgbA1C. She has a family hx of premature CAD in her father. She initially presented for cardiac evaluation with complaints of nonexertional, nocturnal chest discomfort. She also reported dyspnea which may be new therefore she underwent an echocardiogram. Results will be discussed today.

## 2024-02-02 ENCOUNTER — APPOINTMENT (OUTPATIENT)
Dept: PULMONOLOGY | Facility: CLINIC | Age: 49
End: 2024-02-02

## 2024-02-09 ENCOUNTER — APPOINTMENT (OUTPATIENT)
Dept: CARDIOLOGY | Facility: CLINIC | Age: 49
End: 2024-02-09

## 2024-02-12 DIAGNOSIS — I10 ESSENTIAL (PRIMARY) HYPERTENSION: ICD-10-CM

## 2024-03-22 ENCOUNTER — APPOINTMENT (OUTPATIENT)
Dept: PULMONOLOGY | Facility: CLINIC | Age: 49
End: 2024-03-22
Payer: COMMERCIAL

## 2024-03-22 VITALS — HEIGHT: 58 IN | WEIGHT: 150 LBS | BODY MASS INDEX: 31.49 KG/M2

## 2024-03-22 VITALS
OXYGEN SATURATION: 98 % | HEART RATE: 70 BPM | SYSTOLIC BLOOD PRESSURE: 116 MMHG | DIASTOLIC BLOOD PRESSURE: 72 MMHG | RESPIRATION RATE: 14 BRPM

## 2024-03-22 DIAGNOSIS — J45.909 UNSPECIFIED ASTHMA, UNCOMPLICATED: ICD-10-CM

## 2024-03-22 PROCEDURE — 99214 OFFICE O/P EST MOD 30 MIN: CPT | Mod: 25

## 2024-03-22 PROCEDURE — 94010 BREATHING CAPACITY TEST: CPT

## 2024-03-22 RX ORDER — FLUTICASONE FUROATE 100 UG/1
100 POWDER RESPIRATORY (INHALATION) DAILY
Qty: 3 | Refills: 3 | Status: ACTIVE | COMMUNITY
Start: 2022-02-15 | End: 1900-01-01

## 2024-03-22 RX ORDER — TIRZEPATIDE 2.5 MG/.5ML
2.5 INJECTION, SOLUTION SUBCUTANEOUS
Refills: 0 | Status: DISCONTINUED | COMMUNITY
End: 2024-03-22

## 2024-03-22 NOTE — DISCUSSION/SUMMARY
[Asthma] : asthma [Stable] : stable [Responding to Treatment] : responding to treatment [Mild Persistent] : mild persistent [Patient] : the patient [Medication Changes Per Orders] : Medication changes are as documented in orders [de-identified] : Patient advised that if she wishes to she could attempt a trial off of Arnuity before our next visit x 1 month [de-identified] : Arnuity decreased to 100 mcg/day

## 2024-03-22 NOTE — HISTORY OF PRESENT ILLNESS
[Mild Persistent] : mild persistent [Doing Well] : doing well [Well Controlled] : Well controlled [None] : None [Adherent] : the patient is adherent with ~his/her~ medication regimen [Cough at Night] : not awakened at night with cough [de-identified] : Allergic rhinitis, HIV [Wheezing at Night] : not awakened at night because of wheezing or trouble breathing [TextBox_4] : \par

## 2024-03-22 NOTE — CONSULT LETTER
[Dear  ___] : Dear  [unfilled], [Consult Letter:] : I had the pleasure of evaluating your patient, [unfilled]. [Please see my note below.] : Please see my note below. [Sincerely,] : Sincerely, [FreeTextEntry3] : Suhas Boyer MD FCCP\par  Pulmonary/Critical Care/Sleep Medicine\par  Department of Internal Medicine\par  \par  [facility] [employer]\par

## 2024-03-22 NOTE — PROCEDURE
[FreeTextEntry1] : 3/2/2022: PFT- Normal 3/22/2024: Spirometry-compared to previous values are normal and unchanged

## 2024-03-28 RX ORDER — LEVALBUTEROL TARTRATE 45 UG/1
45 AEROSOL, METERED ORAL
Qty: 1 | Refills: 5 | Status: DISCONTINUED | COMMUNITY
Start: 2023-08-28 | End: 2024-03-28

## 2024-03-28 RX ORDER — ALBUTEROL SULFATE 90 UG/1
108 (90 BASE) AEROSOL, METERED RESPIRATORY (INHALATION)
Qty: 3 | Refills: 3 | Status: ACTIVE | COMMUNITY
Start: 2024-03-28 | End: 1900-01-01

## 2024-04-12 ENCOUNTER — APPOINTMENT (OUTPATIENT)
Dept: CARDIOLOGY | Facility: CLINIC | Age: 49
End: 2024-04-12
Payer: COMMERCIAL

## 2024-04-12 PROCEDURE — 93306 TTE W/DOPPLER COMPLETE: CPT

## 2024-04-15 RX ORDER — AMLODIPINE BESYLATE 5 MG/1
5 TABLET ORAL
Qty: 30 | Refills: 0 | Status: DISCONTINUED | COMMUNITY
Start: 2024-02-12 | End: 2024-04-15

## 2024-05-07 ENCOUNTER — APPOINTMENT (OUTPATIENT)
Dept: OBGYN | Facility: CLINIC | Age: 49
End: 2024-05-07

## 2024-07-15 ENCOUNTER — APPOINTMENT (OUTPATIENT)
Dept: OBGYN | Facility: CLINIC | Age: 49
End: 2024-07-15
Payer: COMMERCIAL

## 2024-07-15 VITALS
SYSTOLIC BLOOD PRESSURE: 118 MMHG | HEIGHT: 59 IN | BODY MASS INDEX: 31.65 KG/M2 | WEIGHT: 157 LBS | DIASTOLIC BLOOD PRESSURE: 74 MMHG

## 2024-07-15 DIAGNOSIS — B96.89 ACUTE VAGINITIS: ICD-10-CM

## 2024-07-15 DIAGNOSIS — S30.816A: ICD-10-CM

## 2024-07-15 DIAGNOSIS — N76.0 ACUTE VAGINITIS: ICD-10-CM

## 2024-07-15 DIAGNOSIS — L02.91 CUTANEOUS ABSCESS, UNSPECIFIED: ICD-10-CM

## 2024-07-15 LAB
APPEARANCE: CLEAR
BILIRUBIN URINE: NEGATIVE
BLOOD URINE: ABNORMAL
COLOR: YELLOW
GLUCOSE QUALITATIVE U: NEGATIVE
KETONES URINE: NEGATIVE
LEUKOCYTE ESTERASE URINE: NEGATIVE
NITRITE URINE: NEGATIVE
PH URINE: 5.5
PROTEIN URINE: NEGATIVE
SPECIFIC GRAVITY URINE: >=1.03
UROBILINOGEN URINE: 0.2 (ref 0.2–?)

## 2024-07-15 PROCEDURE — 99213 OFFICE O/P EST LOW 20 MIN: CPT

## 2024-07-15 RX ORDER — METRONIDAZOLE 7.5 MG/G
0.75 GEL VAGINAL
Qty: 70 | Refills: 2 | Status: ACTIVE | COMMUNITY
Start: 2024-07-15 | End: 1900-01-01

## 2024-07-18 LAB
A VAGINAE DNA VAG QL NAA+PROBE: NORMAL
BACTERIA WND CULT: ABNORMAL
BVAB2 DNA VAG QL NAA+PROBE: NORMAL
C KRUSEI DNA VAG QL NAA+PROBE: NEGATIVE
C TRACH RRNA SPEC QL NAA+PROBE: NEGATIVE
CANDIDA DNA VAG QL NAA+PROBE: NEGATIVE
MEGA1 DNA VAG QL NAA+PROBE: NORMAL
N GONORRHOEA RRNA SPEC QL NAA+PROBE: NEGATIVE
T VAGINALIS RRNA SPEC QL NAA+PROBE: NEGATIVE

## 2024-09-16 ENCOUNTER — APPOINTMENT (OUTPATIENT)
Dept: PULMONOLOGY | Facility: CLINIC | Age: 49
End: 2024-09-16
Payer: COMMERCIAL

## 2024-09-16 VITALS
OXYGEN SATURATION: 98 % | RESPIRATION RATE: 16 BRPM | SYSTOLIC BLOOD PRESSURE: 134 MMHG | HEART RATE: 70 BPM | DIASTOLIC BLOOD PRESSURE: 82 MMHG

## 2024-09-16 VITALS — BODY MASS INDEX: 32.75 KG/M2 | WEIGHT: 156 LBS | HEIGHT: 58 IN

## 2024-09-16 DIAGNOSIS — J45.30 MILD PERSISTENT ASTHMA, UNCOMPLICATED: ICD-10-CM

## 2024-09-16 PROCEDURE — 94010 BREATHING CAPACITY TEST: CPT

## 2024-09-16 PROCEDURE — 99214 OFFICE O/P EST MOD 30 MIN: CPT | Mod: 25

## 2024-09-16 NOTE — PROCEDURE
[FreeTextEntry1] : 3/2/2022: PFT- Normal 3/22/2024: Spirometry-compared to previous values are normal and unchanged 9/16/2024: Spirometry-mild decrease in FEV 1 x 300 cc when compared to her at best values

## 2024-09-16 NOTE — HISTORY OF PRESENT ILLNESS
[Mild Persistent] : mild persistent [Doing Well] : doing well [Well Controlled] : Well controlled [None] : None ["Doesn't Seem To Help"] : belief that the medication is not helping [Cough at Night] : not awakened at night with cough [Wheezing at Night] : not awakened at night because of wheezing or trouble breathing [More Frequent Use Needed Recently] : normal [Adherent] : the patient is not adherent with ~his/her~ medication regimen [de-identified] : Allergic rhinitis, HIV [de-identified] : Arnuity decreased to 100 mcg/day at last visit [TextBox_4] : \par

## 2024-09-16 NOTE — CONSULT LETTER
[Dear  ___] : Dear  [unfilled], [Consult Letter:] : I had the pleasure of evaluating your patient, [unfilled]. [Please see my note below.] : Please see my note below. [Sincerely,] : Sincerely, [FreeTextEntry3] : Suhas Boyer MD FCCP\par  Pulmonary/Critical Care/Sleep Medicine\par  Department of Internal Medicine\par  \par  [facility] [employer]\par

## 2024-09-16 NOTE — DISCUSSION/SUMMARY
[Asthma] : asthma [Stable] : stable [Mild Persistent] : mild persistent [Patient] : the patient [Decompensated] : decompensated [None] : There are no changes in medication management [de-identified] : Secondary to noncompliance with Arnuity [de-identified] : Resume Arnuity

## 2024-09-19 ENCOUNTER — NON-APPOINTMENT (OUTPATIENT)
Age: 49
End: 2024-09-19

## 2024-09-19 ENCOUNTER — APPOINTMENT (OUTPATIENT)
Dept: CARDIOLOGY | Facility: CLINIC | Age: 49
End: 2024-09-19
Payer: COMMERCIAL

## 2024-09-19 VITALS
WEIGHT: 156 LBS | HEART RATE: 70 BPM | SYSTOLIC BLOOD PRESSURE: 132 MMHG | RESPIRATION RATE: 16 BRPM | DIASTOLIC BLOOD PRESSURE: 78 MMHG | BODY MASS INDEX: 32.75 KG/M2 | OXYGEN SATURATION: 98 % | HEIGHT: 58 IN

## 2024-09-19 DIAGNOSIS — R07.89 OTHER CHEST PAIN: ICD-10-CM

## 2024-09-19 DIAGNOSIS — E66.9 OBESITY, UNSPECIFIED: ICD-10-CM

## 2024-09-19 DIAGNOSIS — I10 ESSENTIAL (PRIMARY) HYPERTENSION: ICD-10-CM

## 2024-09-19 DIAGNOSIS — E78.00 PURE HYPERCHOLESTEROLEMIA, UNSPECIFIED: ICD-10-CM

## 2024-09-19 DIAGNOSIS — Z82.49 FAMILY HISTORY OF ISCHEMIC HEART DISEASE AND OTHER DISEASES OF THE CIRCULATORY SYSTEM: ICD-10-CM

## 2024-09-19 PROCEDURE — G2211 COMPLEX E/M VISIT ADD ON: CPT | Mod: NC

## 2024-09-19 PROCEDURE — 93000 ELECTROCARDIOGRAM COMPLETE: CPT

## 2024-09-19 PROCEDURE — 99214 OFFICE O/P EST MOD 30 MIN: CPT

## 2024-09-24 NOTE — ASSESSMENT
[FreeTextEntry1] : EKG: SR, RSR in V1 likely normal variant, no significant ST or T wave abnormalities.   ECHOCARDIOGRAM 4/12/24: No residual pericardial effusion is noted.  ECHOCARDIOGRAM 1/2024: LVEF 60-65% No regional WMA Trace MR Trace TR mild to moderate PI Trace AI Trace pericardial effusion  IMPRESSION:  1. Nonexertional chest discomfort not likely cardiac in nature. Relieved with Xanax. Has a hx of nocturnal chest discomfort relieved by PPI. Consider possible GI related or anxiety. EKG today stable and without ischemic changes or evidence suggesting prior infarct.  --- Continue PPI. Avoid late meals.  --- Recommend anxiety reducing techniques. Consider yoga.  --- Given her family hx of premature CAD, personal hx of HLD and elevated HgbA1c, would recommend CT calcium score for better risk stratification.   2.Home blood pressures well controlled.   --- Advised to continue home BP monitoring. Call our office if home sBP consistently above 130.  --- Avoid salt.   3. Family hx of premature CAD and hx of HLD. Most recent LDL 98.  --- Follow a low-fat, low-carbohydrate diet.  --- Repeat lipids through PMD. Due for physical in November.   4. Elevated HgbA1C. Taking Zepbound being managed by PMD.  --- Continue to follow a heart healthy diet consisting of more vegetables, leans meats, whole grains, nuts and fruits. Avoid trans fats, saturated fats and processed meats. --- Repeat labs through PMD.   5. Trace pericardial effusion. May be related to recent viral infection. No significant effusion on repeat echocardiogram.  6. Obesity: Patient advised to adapt dietary and lifestyle changes to promote weight loss.   7. Asthma: Continue medications for asthma per pulmonology. Consider exercise stress testing when asthma is well controlled to assess functional capacity and BP response to exercise.   Clinical follow-up in 4 months or sooner if needed.  EKG obtained to assist in diagnosis and management of assessed problem(s).

## 2024-09-24 NOTE — PHYSICAL EXAM

## 2024-09-24 NOTE — REASON FOR VISIT
[FreeTextEntry1] : WESTON CALLES is a 49 year-old  F presents here for cardiac follow-up with concerns regarding chest discomfort.  Her medical history includes HLD, asthma, elevated HgbA1C. Has a family hx of premature CAD in her father.

## 2024-09-24 NOTE — PHYSICAL EXAM

## 2024-09-24 NOTE — HISTORY OF PRESENT ILLNESS
[FreeTextEntry1] : Patient states that 4 days ago, she was lying on left side, sudden onset chest discomfort described as "pulsing" sensation lasting several minutes. Symptom resolved on its own. Denies any associated diaphoresis, abdominal discomfort, shortness of breath or palpitations.   The following day, she was driving, had the same sensation lasting about an hour, she felt nervous and felt a heart racing sensation, took Xanax went home and took a nap, pain resolved. Episode was not related to shortness of breath.   She denies any edema, orthopnea or PND.   Has asthma which she states is not well controlled. Was not taking her inhaler regularly. Now taking it regularly per the recommendation of her pulmonologist 3 days ago.   Reports home blood pressures are well controlled, typically around 128/70.

## 2024-09-24 NOTE — REVIEW OF SYSTEMS
[Chest Discomfort] : chest discomfort [Negative] : Genitourinary [Feeling Fatigued] : not feeling fatigued [Orthopnea] : no orthopnea

## 2024-09-26 ENCOUNTER — OUTPATIENT (OUTPATIENT)
Dept: OUTPATIENT SERVICES | Facility: HOSPITAL | Age: 49
LOS: 1 days | End: 2024-09-26
Payer: COMMERCIAL

## 2024-09-26 ENCOUNTER — OUTPATIENT (OUTPATIENT)
Dept: OUTPATIENT SERVICES | Facility: HOSPITAL | Age: 49
LOS: 1 days | End: 2024-09-26

## 2024-09-26 ENCOUNTER — APPOINTMENT (OUTPATIENT)
Dept: CT IMAGING | Facility: CLINIC | Age: 49
End: 2024-09-26

## 2024-09-26 DIAGNOSIS — E78.00 PURE HYPERCHOLESTEROLEMIA, UNSPECIFIED: ICD-10-CM

## 2024-09-26 DIAGNOSIS — R07.89 OTHER CHEST PAIN: ICD-10-CM

## 2024-09-26 DIAGNOSIS — Z00.8 ENCOUNTER FOR OTHER GENERAL EXAMINATION: ICD-10-CM

## 2024-09-26 PROCEDURE — 75571 CT HRT W/O DYE W/CA TEST: CPT

## 2024-09-26 PROCEDURE — 75571 CT HRT W/O DYE W/CA TEST: CPT | Mod: 26

## 2024-10-08 ENCOUNTER — APPOINTMENT (OUTPATIENT)
Dept: PLASTIC SURGERY | Facility: CLINIC | Age: 49
End: 2024-10-08

## 2024-10-10 ENCOUNTER — NON-APPOINTMENT (OUTPATIENT)
Age: 49
End: 2024-10-10

## 2024-11-02 ENCOUNTER — APPOINTMENT (OUTPATIENT)
Dept: MAMMOGRAPHY | Facility: CLINIC | Age: 49
End: 2024-11-02
Payer: COMMERCIAL

## 2024-11-02 ENCOUNTER — RESULT REVIEW (OUTPATIENT)
Age: 49
End: 2024-11-02

## 2024-11-02 ENCOUNTER — OUTPATIENT (OUTPATIENT)
Dept: OUTPATIENT SERVICES | Facility: HOSPITAL | Age: 49
LOS: 1 days | End: 2024-11-02
Payer: COMMERCIAL

## 2024-11-02 DIAGNOSIS — Z00.8 ENCOUNTER FOR OTHER GENERAL EXAMINATION: ICD-10-CM

## 2024-11-02 PROCEDURE — 77067 SCR MAMMO BI INCL CAD: CPT | Mod: 26

## 2024-11-02 PROCEDURE — 77067 SCR MAMMO BI INCL CAD: CPT

## 2024-11-02 PROCEDURE — 77063 BREAST TOMOSYNTHESIS BI: CPT

## 2024-11-02 PROCEDURE — 77063 BREAST TOMOSYNTHESIS BI: CPT | Mod: 26

## 2025-01-06 ENCOUNTER — APPOINTMENT (OUTPATIENT)
Dept: OBGYN | Facility: CLINIC | Age: 50
End: 2025-01-06

## 2025-01-15 ENCOUNTER — NON-APPOINTMENT (OUTPATIENT)
Age: 50
End: 2025-01-15

## 2025-01-15 ENCOUNTER — APPOINTMENT (OUTPATIENT)
Dept: CARDIOLOGY | Facility: CLINIC | Age: 50
End: 2025-01-15
Payer: COMMERCIAL

## 2025-01-15 VITALS
HEART RATE: 75 BPM | WEIGHT: 158 LBS | HEIGHT: 58 IN | RESPIRATION RATE: 16 BRPM | DIASTOLIC BLOOD PRESSURE: 86 MMHG | BODY MASS INDEX: 33.17 KG/M2 | SYSTOLIC BLOOD PRESSURE: 134 MMHG

## 2025-01-15 DIAGNOSIS — K21.9 GASTRO-ESOPHAGEAL REFLUX DISEASE W/OUT ESOPHAGITIS: ICD-10-CM

## 2025-01-15 DIAGNOSIS — E78.00 PURE HYPERCHOLESTEROLEMIA, UNSPECIFIED: ICD-10-CM

## 2025-01-15 DIAGNOSIS — F41.9 ANXIETY DISORDER, UNSPECIFIED: ICD-10-CM

## 2025-01-15 DIAGNOSIS — I10 ESSENTIAL (PRIMARY) HYPERTENSION: ICD-10-CM

## 2025-01-15 DIAGNOSIS — R06.09 OTHER FORMS OF DYSPNEA: ICD-10-CM

## 2025-01-15 DIAGNOSIS — E66.811 OBESITY, CLASS 1: ICD-10-CM

## 2025-01-15 PROCEDURE — 99214 OFFICE O/P EST MOD 30 MIN: CPT

## 2025-01-15 PROCEDURE — 93000 ELECTROCARDIOGRAM COMPLETE: CPT

## 2025-02-10 ENCOUNTER — APPOINTMENT (OUTPATIENT)
Dept: PLASTIC SURGERY | Facility: CLINIC | Age: 50
End: 2025-02-10
Payer: COMMERCIAL

## 2025-02-10 VITALS
WEIGHT: 158 LBS | DIASTOLIC BLOOD PRESSURE: 84 MMHG | TEMPERATURE: 98 F | HEART RATE: 92 BPM | HEIGHT: 58 IN | BODY MASS INDEX: 33.17 KG/M2 | SYSTOLIC BLOOD PRESSURE: 142 MMHG

## 2025-02-10 DIAGNOSIS — N62 HYPERTROPHY OF BREAST: ICD-10-CM

## 2025-02-10 PROCEDURE — 99204 OFFICE O/P NEW MOD 45 MIN: CPT

## 2025-03-04 ENCOUNTER — APPOINTMENT (OUTPATIENT)
Dept: PULMONOLOGY | Facility: CLINIC | Age: 50
End: 2025-03-04

## 2025-06-03 LAB — A1CG - A1C WITH ESTIMATED AVERAGE GLUCOSE: 5.9

## 2025-06-05 ENCOUNTER — APPOINTMENT (OUTPATIENT)
Dept: CARDIOLOGY | Facility: CLINIC | Age: 50
End: 2025-06-05

## 2025-06-23 ENCOUNTER — OFFICE (OUTPATIENT)
Dept: URBAN - METROPOLITAN AREA CLINIC 63 | Facility: CLINIC | Age: 50
Setting detail: OPHTHALMOLOGY
End: 2025-06-23
Payer: COMMERCIAL

## 2025-06-23 DIAGNOSIS — H11.31: ICD-10-CM

## 2025-06-23 DIAGNOSIS — H01.002: ICD-10-CM

## 2025-06-23 DIAGNOSIS — H01.005: ICD-10-CM

## 2025-06-23 PROBLEM — H52.4 PRESBYOPIA: Status: ACTIVE | Noted: 2025-06-23

## 2025-06-23 PROCEDURE — 92014 COMPRE OPH EXAM EST PT 1/>: CPT | Performed by: INTERNAL MEDICINE

## 2025-06-23 ASSESSMENT — KERATOMETRY
OS_K1POWER_DIOPTERS: 41.75
OS_K2POWER_DIOPTERS: 42.50
OD_K2POWER_DIOPTERS: 42.00
OS_AXISANGLE_DEGREES: 078
OD_AXISANGLE_DEGREES: 110
OD_K1POWER_DIOPTERS: 41.75

## 2025-06-23 ASSESSMENT — REFRACTION_AUTOREFRACTION
OD_AXIS: 040
OS_SPHERE: +0.75
OD_SPHERE: +1.25
OS_CYLINDER: -0.50
OS_AXIS: 142
OD_CYLINDER: -0.50

## 2025-06-23 ASSESSMENT — REFRACTION_MANIFEST
OD_CYLINDER: -0.25
OS_VA1: 20/20
OD_AXIS: 046
OS_SPHERE: +0.50
OS_AXIS: 148
OS_CYLINDER: -0.25
OD_VA1: 20/20
OS_CYLINDER: -0.25
OD_SPHERE: +1.25
OS_AXIS: 150
OD_CYLINDER: -0.25
OD_SPHERE: +1.00
OS_SPHERE: +1.00
OD_AXIS: 065

## 2025-06-23 ASSESSMENT — CONFRONTATIONAL VISUAL FIELD TEST (CVF)
OD_FINDINGS: FULL
OS_FINDINGS: FULL

## 2025-06-23 ASSESSMENT — REFRACTION_CURRENTRX
OS_SPHERE: +1.00
OD_CYLINDER: -0.25
OD_AXIS: 046
OD_OVR_VA: 20/
OS_VPRISM_DIRECTION: SV
OD_VPRISM_DIRECTION: SV
OS_OVR_VA: 20/
OS_CYLINDER: -0.25
OS_AXIS: 148
OD_SPHERE: +1.25

## 2025-06-23 ASSESSMENT — TONOMETRY
OS_IOP_MMHG: 17
OD_IOP_MMHG: 16

## 2025-06-23 ASSESSMENT — LID EXAM ASSESSMENTS
OS_BLEPHARITIS: LLL T
OD_BLEPHARITIS: RLL T

## 2025-06-23 ASSESSMENT — VISUAL ACUITY
OD_BCVA: 20/20
OS_BCVA: 20/20

## 2025-07-07 ENCOUNTER — APPOINTMENT (OUTPATIENT)
Dept: PULMONOLOGY | Facility: CLINIC | Age: 50
End: 2025-07-07

## 2025-07-07 ENCOUNTER — APPOINTMENT (OUTPATIENT)
Dept: PULMONOLOGY | Facility: CLINIC | Age: 50
End: 2025-07-07
Payer: COMMERCIAL

## 2025-07-07 VITALS — HEIGHT: 58.5 IN | WEIGHT: 155 LBS | BODY MASS INDEX: 31.67 KG/M2

## 2025-07-07 VITALS
HEART RATE: 76 BPM | RESPIRATION RATE: 16 BRPM | SYSTOLIC BLOOD PRESSURE: 138 MMHG | OXYGEN SATURATION: 96 % | DIASTOLIC BLOOD PRESSURE: 84 MMHG

## 2025-07-07 PROCEDURE — 99214 OFFICE O/P EST MOD 30 MIN: CPT | Mod: 25

## 2025-07-07 PROCEDURE — 94010 BREATHING CAPACITY TEST: CPT

## 2025-07-16 ENCOUNTER — APPOINTMENT (OUTPATIENT)
Dept: CARDIOLOGY | Facility: CLINIC | Age: 50
End: 2025-07-16

## 2025-07-16 ENCOUNTER — NON-APPOINTMENT (OUTPATIENT)
Age: 50
End: 2025-07-16

## 2025-07-16 VITALS
BODY MASS INDEX: 31.25 KG/M2 | SYSTOLIC BLOOD PRESSURE: 130 MMHG | DIASTOLIC BLOOD PRESSURE: 70 MMHG | HEART RATE: 77 BPM | WEIGHT: 155 LBS | RESPIRATION RATE: 16 BRPM | HEIGHT: 59 IN

## 2025-07-16 PROCEDURE — 93000 ELECTROCARDIOGRAM COMPLETE: CPT

## 2025-07-16 PROCEDURE — 99214 OFFICE O/P EST MOD 30 MIN: CPT

## 2025-07-16 PROCEDURE — G2211 COMPLEX E/M VISIT ADD ON: CPT | Mod: NC

## 2025-07-16 RX ORDER — AMLODIPINE BESYLATE 5 MG/1
5 TABLET ORAL
Qty: 90 | Refills: 0 | Status: DISCONTINUED | COMMUNITY
Start: 2025-07-16 | End: 2025-07-16

## 2025-07-16 RX ORDER — LOSARTAN POTASSIUM 25 MG/1
25 TABLET, FILM COATED ORAL
Qty: 90 | Refills: 0 | Status: ACTIVE | COMMUNITY
Start: 2025-07-16 | End: 1900-01-01

## 2025-08-05 ENCOUNTER — APPOINTMENT (OUTPATIENT)
Dept: CARDIOLOGY | Facility: CLINIC | Age: 50
End: 2025-08-05
Payer: COMMERCIAL

## 2025-08-05 VITALS
HEIGHT: 59 IN | RESPIRATION RATE: 16 BRPM | BODY MASS INDEX: 32.46 KG/M2 | SYSTOLIC BLOOD PRESSURE: 130 MMHG | HEART RATE: 69 BPM | DIASTOLIC BLOOD PRESSURE: 80 MMHG | OXYGEN SATURATION: 96 % | WEIGHT: 161 LBS

## 2025-08-05 DIAGNOSIS — R60.0 LOCALIZED EDEMA: ICD-10-CM

## 2025-08-05 DIAGNOSIS — I10 ESSENTIAL (PRIMARY) HYPERTENSION: ICD-10-CM

## 2025-08-05 DIAGNOSIS — E78.00 PURE HYPERCHOLESTEROLEMIA, UNSPECIFIED: ICD-10-CM

## 2025-08-05 DIAGNOSIS — E66.811 OBESITY, CLASS 1: ICD-10-CM

## 2025-08-05 PROCEDURE — G2211 COMPLEX E/M VISIT ADD ON: CPT | Mod: NC

## 2025-08-05 PROCEDURE — 99214 OFFICE O/P EST MOD 30 MIN: CPT

## 2025-08-14 DIAGNOSIS — Z00.00 ENCOUNTER FOR GENERAL ADULT MEDICAL EXAMINATION W/OUT ABNORMAL FINDINGS: ICD-10-CM

## 2025-08-15 ENCOUNTER — APPOINTMENT (OUTPATIENT)
Dept: HEMATOLOGY ONCOLOGY | Facility: CLINIC | Age: 50
End: 2025-08-15
Payer: COMMERCIAL

## 2025-08-15 ENCOUNTER — RESULT REVIEW (OUTPATIENT)
Age: 50
End: 2025-08-15

## 2025-08-15 VITALS
OXYGEN SATURATION: 97 % | WEIGHT: 162.06 LBS | HEIGHT: 59 IN | DIASTOLIC BLOOD PRESSURE: 81 MMHG | SYSTOLIC BLOOD PRESSURE: 131 MMHG | BODY MASS INDEX: 32.67 KG/M2 | HEART RATE: 65 BPM

## 2025-08-15 DIAGNOSIS — D50.9 IRON DEFICIENCY ANEMIA, UNSPECIFIED: ICD-10-CM

## 2025-08-15 PROCEDURE — 99203 OFFICE O/P NEW LOW 30 MIN: CPT

## 2025-08-18 LAB
ALBUMIN SERPL ELPH-MCNC: 3.9 G/DL
ALP BLD-CCNC: 89 U/L
ALT SERPL-CCNC: 24 U/L
ANION GAP SERPL CALC-SCNC: 14 MMOL/L
AST SERPL-CCNC: 24 U/L
BILIRUB SERPL-MCNC: 0.2 MG/DL
BUN SERPL-MCNC: 18 MG/DL
CALCIUM SERPL-MCNC: 9.2 MG/DL
CHLORIDE SERPL-SCNC: 100 MMOL/L
CO2 SERPL-SCNC: 23 MMOL/L
CREAT SERPL-MCNC: 1.06 MG/DL
EGFRCR SERPLBLD CKD-EPI 2021: 65 ML/MIN/1.73M2
FERRITIN SERPL-MCNC: 17 NG/ML
FOLATE SERPL-MCNC: 7.2 NG/ML
GLUCOSE SERPL-MCNC: 96 MG/DL
IRON SATN MFR SERPL: 5 %
IRON SERPL-MCNC: 20 UG/DL
POTASSIUM SERPL-SCNC: 4.4 MMOL/L
PROT SERPL-MCNC: 6.6 G/DL
SODIUM SERPL-SCNC: 138 MMOL/L
TIBC SERPL-MCNC: 388 UG/DL
UIBC SERPL-MCNC: 368 UG/DL
VIT B12 SERPL-MCNC: 302 PG/ML

## 2025-08-25 ENCOUNTER — APPOINTMENT (OUTPATIENT)
Dept: PLASTIC SURGERY | Facility: CLINIC | Age: 50
End: 2025-08-25

## 2025-08-25 VITALS
OXYGEN SATURATION: 96 % | HEIGHT: 59 IN | BODY MASS INDEX: 32.25 KG/M2 | DIASTOLIC BLOOD PRESSURE: 90 MMHG | WEIGHT: 160 LBS | RESPIRATION RATE: 16 BRPM | HEART RATE: 70 BPM | SYSTOLIC BLOOD PRESSURE: 143 MMHG | TEMPERATURE: 97.6 F